# Patient Record
Sex: FEMALE | Race: WHITE | NOT HISPANIC OR LATINO | URBAN - METROPOLITAN AREA
[De-identification: names, ages, dates, MRNs, and addresses within clinical notes are randomized per-mention and may not be internally consistent; named-entity substitution may affect disease eponyms.]

---

## 2018-11-01 ENCOUNTER — OFFICE VISIT (OUTPATIENT)
Dept: ORTHOPEDIC SURGERY | Facility: CLINIC | Age: 68
End: 2018-11-01

## 2018-11-01 VITALS — OXYGEN SATURATION: 96 % | WEIGHT: 194.22 LBS | HEIGHT: 63 IN | HEART RATE: 72 BPM | BODY MASS INDEX: 34.41 KG/M2

## 2018-11-01 DIAGNOSIS — M65.30 TRIGGER FINGER, ACQUIRED: Primary | ICD-10-CM

## 2018-11-01 PROCEDURE — 99204 OFFICE O/P NEW MOD 45 MIN: CPT | Performed by: ORTHOPAEDIC SURGERY

## 2018-11-01 NOTE — PROGRESS NOTES
[facility] [place] [facility]opa[facility] Surgery Clinic Note    Subjective     Pain of the Right Hand (Right hand. Ring finger. No injury but started after yard work in spring. No treatment, ice and heat tried. Getting worse over time. 8/10 pain when extended hand. Can , but can't extend finger after.)      RANCHO Lin is a 68 y.o. female.  Patient is here today for an issue with her right ring digit.  She is right-hand-dominant and this began in the spring of 2018.  No history of any trauma.  The finger is locking.  She has exquisite pain when she goes from the locked position to the extended position.  She has tried anti-inflammatories and activity modification without a lot of improvement.  She is here for further evaluation and treatment.     Past Medical History:   Diagnosis Date   • Arthritis    • Shoulder injury     left   • Tear of meniscus of knee       Past Surgical History:   Procedure Laterality Date   • APPENDECTOMY     • KNEE SURGERY Right    • TONSILLECTOMY        Family History   Problem Relation Age of Onset   • Cancer Other      Social History     Social History   • Marital status:      Spouse name: N/A   • Number of children: N/A   • Years of education: N/A     Occupational History   • Not on file.     Social History Main Topics   • Smoking status: Never Smoker   • Smokeless tobacco: Not on file   • Alcohol use Yes      Comment: socially   • Drug use: No   • Sexual activity: Defer     Other Topics Concern   • Not on file     Social History Narrative   • No narrative on file      No current outpatient prescriptions on file prior to visit.     No current facility-administered medications on file prior to visit.       No Known Allergies     The following portions of the patient's history were reviewed and updated as appropriate: allergies, current medications, past family history, past medical history, past social history, past surgical history and problem list.    Review of Systems  "  Musculoskeletal: Positive for arthralgias and joint swelling.   All other systems reviewed and are negative.       Objective      Physical Exam  Pulse 72   Ht 160 cm (63\")   Wt 88.1 kg (194 lb 3.6 oz)   SpO2 96%   BMI 34.41 kg/m²     Body mass index is 34.41 kg/m².    General  Mental Status - alert  General Appearance - cooperative, well groomed, not in acute distress  Orientation - Oriented X3  Build & Nutrition - well developed and well nourished  Posture - normal posture  Gait - normal gait     Integumentary  Global Assessment  Examination of related systems reveals - no lymphadenopathy  Ears:  No abnormality  Nose:  No mucous drainage  General Characteristics  Overall examination of the patient's skin reveals - no rashes, no evidence of scars, no suspicious lesions and no bruises.  Color - normal coloration of skin.  Vascular: Brisk capillary refill in all extremities    Ortho Exam  Peripheral Vascular   Bilateral Upper Extremity    No cyanotic nail beds    Pink nail beds and rapid capillary refill   Palpation    Radial Pulse - Bilaterally normal    Neurologic   Sensory: Light touch intact- Right hand       Right Upper Extremity    Right wrist extensors: 5/5    Right wrist flexors: 5/5    Right intrinsics: 5/5    Musculoskeletal      Right Elbow    Forearm supination: AROM - 90 degrees    Forearm pronation: AROM - 90 degrees     Inspection and Palpation   Right Wrist      Tenderness - none    Swelling - none    Crepitus - none    Muscle tone - no atrophy        ROJM:      Right Wrist    Flexion: AROM - 90 degrees    Extension: AROM - 90 degrees     Deformities, Malalignments, Discrepancies    None     Functional Testing   Right Wrist    Tinel's Sign-- negative    Phalen's Sign-- negative    Carpal Compression Test-- negative    Finklestein's Test-- negative    Thumb CMC joint--negative       Strength and Tone    Right  strength: good         Hand Exam:  There is reproducible locking with the right ring " digit, palpable nodule, tender to palpation at the A1 pulley, mild Dupuytren's cord noted.    Imaging/Studies  Imaging Results (last 24 hours)     Procedure Component Value Units Date/Time    XR Hand 3+ View Right [[id number]] Resulted:  11/01/18 1042     Updated:  11/01/18 1043    Narrative:       Right Hand X-Ray    Indication: Pain    Views:  AP, Lateral, and Oblique     Comparison: none    Findings:  No fracture  No bony lesion  Normal soft tissues  Normal joint spaces    Impression: No acute bony abnormalities right hand                Assessment:  1. Trigger finger, acquired        Plan:  1. Continue over-the-counter medication as needed for discomfort  2. With a long discussion with the patient today about treatment options and alternatives.  At this point, we have told her that injections can be done but are typically less effective when there is such a profound locking noted.  The risks, benefits, potential hazards, typical recovery and rehabilitation as well as wrist alternatives to right ring digit trigger finger release were discussed with the patient this morning.  She had the opportunity to ask questions and wishes to proceed with scheduling.  We will get this done at the next available date.      Medical Decision Making  Management Options : over-the-counter medicine and major surgery with risk factors  Data/Risk: radiology tests and independent visualization of imaging, lab tests, or EMG/NCV    Vick Bradley MD  11/01/18  6:53 PM

## 2018-11-02 ENCOUNTER — OUTSIDE FACILITY SERVICE (OUTPATIENT)
Dept: ORTHOPEDIC SURGERY | Facility: CLINIC | Age: 68
End: 2018-11-02

## 2018-11-02 PROCEDURE — 26055 INCISE FINGER TENDON SHEATH: CPT | Performed by: ORTHOPAEDIC SURGERY

## 2018-11-20 ENCOUNTER — OFFICE VISIT (OUTPATIENT)
Dept: ORTHOPEDIC SURGERY | Facility: CLINIC | Age: 68
End: 2018-11-20

## 2018-11-20 DIAGNOSIS — M65.30 TRIGGER FINGER, ACQUIRED: ICD-10-CM

## 2018-11-20 DIAGNOSIS — Z98.890 S/P TRIGGER FINGER RELEASE: Primary | ICD-10-CM

## 2018-11-20 PROCEDURE — 99024 POSTOP FOLLOW-UP VISIT: CPT | Performed by: PHYSICIAN ASSISTANT

## 2018-11-20 RX ORDER — HYDROCODONE BITARTRATE AND ACETAMINOPHEN 5; 325 MG/1; MG/1
TABLET ORAL
COMMUNITY
Start: 2018-11-02 | End: 2018-11-20

## 2018-11-20 RX ORDER — BENZONATATE 200 MG/1
CAPSULE ORAL
COMMUNITY
Start: 2018-11-02 | End: 2018-11-20

## 2018-11-20 NOTE — PROGRESS NOTES
[facility] [place] Orthopaedic Surgery Clinic Note    Subjective     CC: Post-op (18 days s/p RIGHT RING DIGIT A1 PULLEY RELEASE - 11/02/18)      RANCHO Lin is a 68 y.o. female.  Patient returns today for initial postop visit status post right ring finger A1 pulley trigger release performed on the above date by Dr. Bradley.  She has no complaints or issues.  States that the trigger and she was noted preoperatively is no longer exist.  She still endorses mild discomfort and pain with a pain scale 2/10.  Severity the pain is dull.  She also noticed a slight flexed position to the finger.       ROS:    Constiutional:Pt denies fever, chills, nausea, or vomiting.  MSK:as above    Objective      Past Medical History  Past Medical History:   Diagnosis Date   • Arthritis    • Shoulder injury     left   • Tear of meniscus of knee          Physical Exam  There were no vitals taken for this visit.    There is no height or weight on file to calculate BMI.    Patient is well nourished and well developed.        Ortho Exam    Right Upper Extremity:        Musculoskeletal     Inspection and Palpation:      Hand/Wrist:      Tenderness - mild directly over surgical site    Swelling - minimal     ROM:  Slightly diminished but within normal limits    Motion: Grossly intact radial, ulnar, median, AIN, PIN.    Sensory: Grossly intact light touch radial, ulnar, median nerve distributions    Vascular: 2+ radial pulse with brisk capillary refill noted and each digit.       Incision:  Sutures are in place with wound healing well.  No redness, drainage, warmth or evidence of infection noted       Imaging/Labs/EMG Reviewed:  Imaging Results (last 24 hours)     ** No results found for the last 24 hours. **      None today.    Assessment:  1. S/P trigger finger release    2. Trigger finger, acquired        Plan:  1. Discussion was had with patient regarding exam, assessment and treatments for status post right ring trigger digit  release.  2. Sutures were removed today.  3. Encourage range of motion.  Reviewed wrist and hand exercises and provided handout demonstrating the exercises.  4. She was provided a splint to wear at night to keep the finger in extension and help stretch out.  5. Recommend over-the-counter pain medication as needed.  6. Follow-up in 3-4 weeks for repeat evaluation, sooner if issues arise.  7. Question and concerns answered.      Medical Decision Making  Management Options : over-the-counter medicine      Lorna Olson PA-C  11/20/18  10:47 AM

## 2018-12-11 ENCOUNTER — OFFICE VISIT (OUTPATIENT)
Dept: ORTHOPEDIC SURGERY | Facility: CLINIC | Age: 68
End: 2018-12-11

## 2018-12-11 DIAGNOSIS — Z98.890 S/P TRIGGER FINGER RELEASE: Primary | ICD-10-CM

## 2018-12-11 DIAGNOSIS — M65.30 TRIGGER FINGER, ACQUIRED: ICD-10-CM

## 2018-12-11 PROCEDURE — 99024 POSTOP FOLLOW-UP VISIT: CPT | Performed by: PHYSICIAN ASSISTANT

## 2018-12-11 NOTE — PROGRESS NOTES
[facility] [place] Orthopa[facility] Surgery Clinic Note    Subjective     Post-op (3 week f/u; 5 weeks status post Right Ring Digit A1 Pulley Release 11/02/18)       RANCHO Lin is a 68 y.o. female.  Patient returns for follow-up evaluation right ring finger A-1 pulley release performed on the above date by Dr. Bradley.  No complaints or issues at this time.  She does note some scar tissue buildup the incision site she is working on scar tissue massage.  She has no pain, no numbness or tingling into the digit.  She's had no further locking or catching since surgery.  No reported fever, chills, night sweats or other constitutional symptoms.         Objective      Physical Exam  There were no vitals taken for this visit.    There is no height or weight on file to calculate BMI.        Ortho Exam  Right Upper Extremity:       Musculoskeletal                 Inspection and Palpation:                  Hand/Wrist:                              Tenderness - none                          Swelling - none                                 ROM:   full wrist, hand and digital range of motion without any restrictions or limitations.  All ranges of motion are pain-free.                          Motion: Grossly intact radial, ulnar, median, AIN, PIN.                          Sensory: Grossly intact light touch radial, ulnar, median nerve distributions                          Vascular: 2+ radial pulse with brisk capillary refill noted and each digit.                                        Incision:   Incision is well-healed without redness, warmth, drainage or evidence of infection.        Assessment:  1. S/P trigger finger release    2. Trigger finger, acquired        Plan:  1. Discussion was had with patient regarding exam, assessment and treatments for status post right ring trigger digit release.  2. Continue with advancing activity as tolerated.  3. Recommend over-the-counter pain medication as needed.  4. Follow-up as  needed.  5. Question and concerns answered.      Lorna Olson PA-C  12/11/18  10:14 AM

## 2021-03-05 ENCOUNTER — IMMUNIZATION (OUTPATIENT)
Dept: VACCINE CLINIC | Facility: HOSPITAL | Age: 71
End: 2021-03-05

## 2021-03-05 PROCEDURE — 91300 HC SARSCOV02 VAC 30MCG/0.3ML IM: CPT | Performed by: INTERNAL MEDICINE

## 2021-03-05 PROCEDURE — 0001A: CPT | Performed by: INTERNAL MEDICINE

## 2021-03-26 ENCOUNTER — IMMUNIZATION (OUTPATIENT)
Dept: VACCINE CLINIC | Facility: HOSPITAL | Age: 71
End: 2021-03-26

## 2021-03-26 PROCEDURE — 0002A: CPT | Performed by: INTERNAL MEDICINE

## 2021-03-26 PROCEDURE — 91300 HC SARSCOV02 VAC 30MCG/0.3ML IM: CPT | Performed by: INTERNAL MEDICINE

## 2021-06-14 ENCOUNTER — OFFICE VISIT (OUTPATIENT)
Dept: CARDIOLOGY | Facility: CLINIC | Age: 71
End: 2021-06-14

## 2021-06-14 VITALS
BODY MASS INDEX: 33.84 KG/M2 | DIASTOLIC BLOOD PRESSURE: 79 MMHG | OXYGEN SATURATION: 98 % | HEIGHT: 63 IN | TEMPERATURE: 97.2 F | HEART RATE: 66 BPM | WEIGHT: 191 LBS | SYSTOLIC BLOOD PRESSURE: 135 MMHG

## 2021-06-14 DIAGNOSIS — R00.2 PALPITATIONS: Primary | ICD-10-CM

## 2021-06-14 PROBLEM — J30.2 SEASONAL ALLERGIES: Status: ACTIVE | Noted: 2021-06-14

## 2021-06-14 PROBLEM — K21.9 GERD (GASTROESOPHAGEAL REFLUX DISEASE): Status: ACTIVE | Noted: 2021-06-14

## 2021-06-14 PROCEDURE — 93000 ELECTROCARDIOGRAM COMPLETE: CPT | Performed by: NURSE PRACTITIONER

## 2021-06-14 PROCEDURE — 99203 OFFICE O/P NEW LOW 30 MIN: CPT | Performed by: NURSE PRACTITIONER

## 2021-06-14 RX ORDER — ASPIRIN 81 MG/1
81 TABLET ORAL DAILY
Qty: 90 TABLET | Refills: 3 | Status: SHIPPED | OUTPATIENT
Start: 2021-06-14 | End: 2021-09-23

## 2021-06-14 NOTE — PROGRESS NOTES
Subjective   Analy Lin is a 71 y.o. female     Chief Complaint   Patient presents with   • Establish Care       HPI    Problem list:    1.  Palpitations  2.  GERD  3.  Seasonal allergies    Patient is a 71-year-old female who presents today to establish care due to palpitations.  Patient says for the last 9 months she has had 3 incidents where she had significant fluttering, racing and skipped beats.  She says it can occur anytime.  She says her blood pressure will go up when it happens.  She says she does not necessarily get short of breath but she knows that she will be breathing faster.  She says sometimes it will make her cough.  Patient brought in examples of blood pressure and heart rate she had an episode on 517 where blood pressure was 118/100 and her heart rate was 147.  She says An is start climbing climbing and then it will come back down.  She says there is no rhyme or reason for when it happens.  She denies any chest pain, pressure, dizziness, presyncope, syncope, orthopnea, PND or edema.  She denies any shortness of breath with activity.    Occupation: Retired nurse  She denies smoking, occasional wine or light beer; no illicit drugs  Rare Zetia soda, 1 cup of coffee per day during summer; 2 to 3 cups of coffee during winter; occasional glass of tea    Mom 92 alive-no health problems  Dad 76 -lymphoma  Sister 71.  Alive-palpitations, questionable A. fib    Current Outpatient Medications on File Prior to Visit   Medication Sig Dispense Refill   • [DISCONTINUED] Fexofenadine HCl (ALLEGRA ALLERGY PO) Take  by mouth.       No current facility-administered medications on file prior to visit.       ALLERGIES    Patient has no known allergies.    Past Medical History:   Diagnosis Date   • Arthritis    • COVID-19 vaccine administered  - 2021    2nd -  2021 - Pfzier    • Shoulder injury     left   • Tear of meniscus of knee        Social History     Socioeconomic History   •  Marital status:      Spouse name: Not on file   • Number of children: Not on file   • Years of education: Not on file   • Highest education level: Not on file   Tobacco Use   • Smoking status: Never Smoker   • Smokeless tobacco: Never Used   Substance and Sexual Activity   • Alcohol use: Yes     Comment: socially   • Drug use: No   • Sexual activity: Defer       Family History   Problem Relation Age of Onset   • Cancer Other    • No Known Problems Mother    • Cancer Father    • Hypertension Father        Review of Systems   Constitutional: Negative for appetite change, chills, diaphoresis, fatigue and fever.   HENT: Negative for congestion, rhinorrhea and sore throat.    Eyes: Positive for visual disturbance (contacts ).   Respiratory: Positive for cough (dry ). Negative for chest tightness, shortness of breath and wheezing.    Cardiovascular: Positive for palpitations (fluttering, races and skips beats; last 9 mos 3rd incident; can occur at anytime, BP will go up; breath faster with it; sometimes makes her cough ). Negative for chest pain and leg swelling.   Gastrointestinal: Negative for abdominal pain, blood in stool, constipation, diarrhea, nausea and vomiting.   Endocrine: Negative for cold intolerance and heat intolerance.   Genitourinary: Negative for difficulty urinating, dysuria, frequency, hematuria and urgency.   Musculoskeletal: Positive for arthralgias (knees , shoulders , right hip ), back pain (lower back ) and neck stiffness (when she sleps wrong ). Negative for joint swelling and neck pain.   Skin: Negative for color change, pallor, rash and wound.   Allergic/Immunologic: Negative for environmental allergies and food allergies.   Neurological: Positive for numbness (right arm from time to time ). Negative for dizziness, weakness, light-headedness and headaches.   Hematological: Does not bruise/bleed easily.   Psychiatric/Behavioral: Negative for sleep disturbance.       Objective   /79  "(BP Location: Right arm)   Pulse 66   Temp 97.2 °F (36.2 °C)   Ht 160 cm (63\")   Wt 86.6 kg (191 lb)   SpO2 98%   BMI 33.83 kg/m²   Vitals:    06/14/21 1022   BP: 135/79   BP Location: Right arm   Pulse: 66   Temp: 97.2 °F (36.2 °C)   SpO2: 98%   Weight: 86.6 kg (191 lb)   Height: 160 cm (63\")      Lab Results (most recent)     None        Physical Exam  Vitals reviewed.   Constitutional:       General: She is awake.      Appearance: Normal appearance. She is well-developed and well-groomed. She is obese.   HENT:      Head: Normocephalic.   Eyes:      General: Lids are normal.   Neck:      Vascular: No carotid bruit, hepatojugular reflux or JVD.   Cardiovascular:      Rate and Rhythm: Normal rate and regular rhythm.      Pulses:           Radial pulses are 2+ on the right side and 2+ on the left side.        Dorsalis pedis pulses are 2+ on the right side and 2+ on the left side.        Posterior tibial pulses are 2+ on the right side and 2+ on the left side.      Heart sounds: Normal heart sounds.   Pulmonary:      Effort: Pulmonary effort is normal.      Breath sounds: Normal breath sounds and air entry.   Abdominal:      General: Bowel sounds are normal.      Palpations: Abdomen is soft.   Musculoskeletal:      Right lower leg: No edema.      Left lower leg: No edema.   Skin:     General: Skin is warm and dry.   Neurological:      Mental Status: She is alert and oriented to person, place, and time.   Psychiatric:         Attention and Perception: Attention and perception normal.         Mood and Affect: Mood and affect normal.         Speech: Speech normal.         Behavior: Behavior normal. Behavior is cooperative.         Thought Content: Thought content normal.         Cognition and Memory: Cognition and memory normal.         Judgment: Judgment normal.         Procedure     ECG 12 Lead    Date/Time: 6/14/2021 11:12 AM  Performed by: Lynette Sabillon APRN  Authorized by: Lynette Sabillon APRN "   Comparison: not compared with previous ECG   Rhythm: sinus rhythm  Rate: normal  BPM: 62  QRS axis: normal  Other findings: low voltage    Clinical impression: non-specific ECG                 Assessment/Plan      Diagnosis Plan   1. Palpitations  aspirin (aspirin) 81 MG EC tablet    Cardiac Event Monitor       Return in about 12 weeks (around 9/6/2021).       Palpitations-patient will start aspirin 81.  She will wear an event monitor for 2 weeks.  She will follow-up in 12 weeks or sooner if any changes or abnormalities with testing.  Patient's blood pressure at home is excellent as low as 97 systolic.    Analy Lindquistnaa  reports that she has never smoked. She has never used smokeless tobacco..Advance Care Planning   ACP discussion was declined by the patient. Patient does not have an advance directive, declines further assistance.  Electronically signed by:

## 2021-06-14 NOTE — PATIENT INSTRUCTIONS
Advance Care Planning and Advance Directives     You make decisions on a daily basis - decisions about where you want to live, your career, your home, your life. Perhaps one of the most important decisions you face is your choice for future medical care. Take time to talk with your family and your healthcare team and start planning today.  Advance Care Planning is a process that can help you:  · Understand possible future healthcare decisions in light of your own experiences  · Reflect on those decision in light of your goals and values  · Discuss your decisions with those closest to you and the healthcare professionals that care for you  · Make a plan by creating a document that reflects your wishes    Surrogate Decision Maker  In the event of a medical emergency, which has left you unable to communicate or to make your own decisions, you would need someone to make decisions for you.  It is important to discuss your preferences for medical treatment with this person while you are in good health.     Qualities of a surrogate decision maker:  • Willing to take on this role and responsibility  • Knows what you want for future medical care  • Willing to follow your wishes even if they don't agree with them  • Able to make difficult medical decisions under stressful circumstances    Advance Directives  These are legal documents you can create that will guide your healthcare team and decision maker(s) when needed. These documents can be stored in the electronic medical record.    · Living Will - a legal document to guide your care if you have a terminal condition or a serious illness and are unable to communicate. States vary by statute in document names/types, but most forms may include one or more of the following:        -  Directions regarding life-prolonging treatments        -  Directions regarding artificially provided nutrition/hydration        -  Choosing a healthcare decision maker        -  Direction  regarding organ/tissue donation    · Durable Power of  for Healthcare - this document names an -in-fact to make medical decisions for you, but it may also allow this person to make personal and financial decisions for you. Please seek the advice of an  if you need this type of document.    **Advance Directives are not required and no one may discriminate against you if you do not sign one.    Medical Orders  Many states allow specific forms/orders signed by your physician to record your wishes for medical treatment in your current state of health. This form, signed in personal communication with your physician, addresses resuscitation and other medical interventions that you may or may not want.      For more information or to schedule a time with a [facility] Advance Care Planning Facilitator contact: [facility] or call [phone] and someone will contact you directly.  Palpitations  Palpitations are feelings that your heartbeat is irregular or is faster than normal. It may feel like your heart is fluttering or skipping a beat. Palpitations are usually not a serious problem. They may be caused by many things, including smoking, caffeine, alcohol, stress, and certain medicines or drugs. Most causes of palpitations are not serious. However, some palpitations can be a sign of a serious problem. You may need further tests to rule out serious medical problems.  Follow these instructions at home:         Pay attention to any changes in your condition. Take these actions to help manage your symptoms:  Eating and drinking  · Avoid foods and drinks that may cause palpitations. These may include:  ? Caffeinated coffee, tea, soft drinks, diet pills, and energy drinks.  ? Chocolate.  ? Alcohol.  Lifestyle  · Take steps to reduce your stress and anxiety. Things that can help you relax include:  ? Yoga.  ? Mind-body activities, such as deep breathing, meditation, or using words and images  to create positive thoughts (guided imagery).  ? Physical activity, such as swimming, jogging, or walking. Tell your health care provider if your palpitations increase with activity. If you have chest pain or shortness of breath with activity, do not continue the activity until you are seen by your health care provider.  ? Biofeedback. This is a method that helps you learn to use your mind to control things in your body, such as your heartbeat.  · Do not use drugs, including cocaine or ecstasy. Do not use marijuana.  · Get plenty of rest and sleep. Keep a regular bed time.  General instructions  · Take over-the-counter and prescription medicines only as told by your health care provider.  · Do not use any products that contain nicotine or tobacco, such as cigarettes and e-cigarettes. If you need help quitting, ask your health care provider.  · Keep all follow-up visits as told by your health care provider. This is important. These may include visits for further testing if palpitations do not go away or get worse.  Contact a health care provider if you:  · Continue to have a fast or irregular heartbeat after 24 hours.  · Notice that your palpitations occur more often.  Get help right away if you:  · Have chest pain or shortness of breath.  · Have a severe headache.  · Feel dizzy or you faint.  Summary  · Palpitations are feelings that your heartbeat is irregular or is faster than normal. It may feel like your heart is fluttering or skipping a beat.  · Palpitations may be caused by many things, including smoking, caffeine, alcohol, stress, certain medicines, and drugs.  · Although most causes of palpitations are not serious, some causes can be a sign of a serious medical problem.  · Get help right away if you faint or have chest pain, shortness of breath, a severe headache, or dizziness.  This information is not intended to replace advice given to you by your health care provider. Make sure you discuss any questions  you have with your health care provider.  Document Revised: 01/30/2019 Document Reviewed: 01/30/2019  Elsevier Patient Education © 2021 [employer]

## 2021-07-08 DIAGNOSIS — R07.89 CHEST PAIN, ATYPICAL: ICD-10-CM

## 2021-07-08 DIAGNOSIS — R00.2 PALPITATIONS: Primary | ICD-10-CM

## 2021-07-08 DIAGNOSIS — I47.29 NSVT (NONSUSTAINED VENTRICULAR TACHYCARDIA) (HCC): ICD-10-CM

## 2021-07-08 RX ORDER — ATENOLOL 25 MG/1
12.5 TABLET ORAL DAILY
Qty: 30 TABLET | Refills: 11 | Status: SHIPPED | OUTPATIENT
Start: 2021-07-08 | End: 2021-09-23 | Stop reason: SDUPTHER

## 2021-07-08 NOTE — TELEPHONE ENCOUNTER
Received End of Monitor Results . Per MARTA Gomez     Pt needs Echo, and stress test  if she can walk on treadmill, pt needs labs ( BMP,Mg,TSH, Free t3 and Free T4,) pt will need to start Atenolol 25 mg ( 1/2 tablet ) daily       Pt agreed to have Echo and stress test , she will have labs drawn the day of test . Meds called into [facility] . PT will have labs drawn here next week .

## 2021-08-16 ENCOUNTER — TELEPHONE (OUTPATIENT)
Dept: CARDIOLOGY | Facility: CLINIC | Age: 71
End: 2021-08-16

## 2021-08-16 ENCOUNTER — LAB (OUTPATIENT)
Dept: LAB | Facility: HOSPITAL | Age: 71
End: 2021-08-16

## 2021-08-16 DIAGNOSIS — R07.89 CHEST PAIN, ATYPICAL: ICD-10-CM

## 2021-08-16 DIAGNOSIS — R00.2 PALPITATIONS: ICD-10-CM

## 2021-08-16 LAB
ANION GAP SERPL CALCULATED.3IONS-SCNC: 9.8 MMOL/L (ref 5–15)
BUN SERPL-MCNC: 15 MG/DL (ref 8–23)
BUN/CREAT SERPL: 19 (ref 7–25)
CALCIUM SPEC-SCNC: 9.6 MG/DL (ref 8.6–10.5)
CHLORIDE SERPL-SCNC: 105 MMOL/L (ref 98–107)
CO2 SERPL-SCNC: 27.2 MMOL/L (ref 22–29)
CREAT SERPL-MCNC: 0.79 MG/DL (ref 0.57–1)
GFR SERPL CREATININE-BSD FRML MDRD: 72 ML/MIN/1.73
GLUCOSE SERPL-MCNC: 96 MG/DL (ref 65–99)
MAGNESIUM SERPL-MCNC: 2.2 MG/DL (ref 1.6–2.4)
POTASSIUM SERPL-SCNC: 4.2 MMOL/L (ref 3.5–5.2)
SODIUM SERPL-SCNC: 142 MMOL/L (ref 136–145)
T4 FREE SERPL-MCNC: 1.32 NG/DL (ref 0.93–1.7)
TSH SERPL DL<=0.05 MIU/L-ACNC: 2.03 UIU/ML (ref 0.27–4.2)

## 2021-08-16 PROCEDURE — 84481 FREE ASSAY (FT-3): CPT

## 2021-08-16 PROCEDURE — 84439 ASSAY OF FREE THYROXINE: CPT

## 2021-08-16 PROCEDURE — 36415 COLL VENOUS BLD VENIPUNCTURE: CPT

## 2021-08-16 PROCEDURE — 83735 ASSAY OF MAGNESIUM: CPT

## 2021-08-16 PROCEDURE — 84443 ASSAY THYROID STIM HORMONE: CPT

## 2021-08-16 PROCEDURE — 80048 BASIC METABOLIC PNL TOTAL CA: CPT

## 2021-08-16 NOTE — TELEPHONE ENCOUNTER
----- Message from MARTA Hall sent at 8/16/2021  3:43 PM EDT -----  Please advise patient labs are good      Left mess for pt regarding lab results :     Magnesium   1.6 - 2.4 mg/dL 2.2     Creatinine   0.57 - 1.00 mg/dL 0.79    Sodium   136 - 145 mmol/L 142    Potassium   3.5 - 5.2 mmol/L 4.2      TSH   0.270 - 4.200 uIU/mL 2.030      Free T4   0.93 - 1.70 ng/dL 1.32      SULLY Shipley

## 2021-08-17 LAB — T3FREE SERPL-MCNC: 2.9 PG/ML (ref 2–4.4)

## 2021-08-24 ENCOUNTER — HOSPITAL ENCOUNTER (OUTPATIENT)
Dept: CARDIOLOGY | Facility: HOSPITAL | Age: 71
Discharge: HOME | End: 2021-08-24

## 2021-08-24 DIAGNOSIS — I47.29 NSVT (NONSUSTAINED VENTRICULAR TACHYCARDIA) (HCC): ICD-10-CM

## 2021-08-24 DIAGNOSIS — R07.89 CHEST PAIN, ATYPICAL: ICD-10-CM

## 2021-08-24 DIAGNOSIS — R00.2 PALPITATIONS: ICD-10-CM

## 2021-08-24 PROCEDURE — 93306 TTE W/DOPPLER COMPLETE: CPT | Performed by: INTERNAL MEDICINE

## 2021-08-24 PROCEDURE — 93017 CV STRESS TEST TRACING ONLY: CPT

## 2021-08-24 PROCEDURE — 93306 TTE W/DOPPLER COMPLETE: CPT

## 2021-08-24 PROCEDURE — 93018 CV STRESS TEST I&R ONLY: CPT | Performed by: INTERNAL MEDICINE

## 2021-08-25 ENCOUNTER — TELEPHONE (OUTPATIENT)
Dept: CARDIOLOGY | Facility: CLINIC | Age: 71
End: 2021-08-25

## 2021-08-25 LAB
BH CV STRESS RECOVERY BP: NORMAL MMHG
BH CV STRESS RECOVERY HR: 72 BPM
MAXIMAL PREDICTED HEART RATE: 151 BPM
PERCENT MAX PREDICTED HR: 86.09 %
STRESS BASELINE BP: 114 MMHG
STRESS BASELINE HR: 54 BPM
STRESS O2 SAT REST: 58 %
STRESS PERCENT HR: 101 %
STRESS POST ESTIMATED WORKLOAD: 7 METS
STRESS POST EXERCISE DUR MIN: 6 MIN
STRESS POST PEAK BP: NORMAL MMHG
STRESS POST PEAK HR: 130 BPM
STRESS TARGET HR: 128 BPM

## 2021-08-25 NOTE — TELEPHONE ENCOUNTER
----- Message from MARTA Hall sent at 8/25/2021  6:09 AM EDT -----  Please advise patient.        Pt was advised of Stress Test results :    No EKG evidence of ischemia    SULLY Shipley

## 2021-08-29 LAB
BH CV ECHO MEAS - ACS: 2 CM
BH CV ECHO MEAS - AO MAX PG: 9.7 MMHG
BH CV ECHO MEAS - AO MEAN PG: 5 MMHG
BH CV ECHO MEAS - AO ROOT AREA (BSA CORRECTED): 1.6
BH CV ECHO MEAS - AO ROOT AREA: 7.1 CM^2
BH CV ECHO MEAS - AO ROOT DIAM: 3 CM
BH CV ECHO MEAS - AO V2 MAX: 156 CM/SEC
BH CV ECHO MEAS - AO V2 MEAN: 104 CM/SEC
BH CV ECHO MEAS - AO V2 VTI: 36.3 CM
BH CV ECHO MEAS - BSA(HAYCOCK): 2 M^2
BH CV ECHO MEAS - BSA: 1.9 M^2
BH CV ECHO MEAS - BZI_BMI: 33.8 KILOGRAMS/M^2
BH CV ECHO MEAS - BZI_METRIC_HEIGHT: 160 CM
BH CV ECHO MEAS - BZI_METRIC_WEIGHT: 86.6 KG
BH CV ECHO MEAS - EDV(CUBED): 71 ML
BH CV ECHO MEAS - EDV(MOD-SP4): 65.9 ML
BH CV ECHO MEAS - EDV(TEICH): 75.9 ML
BH CV ECHO MEAS - EF(CUBED): 79.3 %
BH CV ECHO MEAS - EF(MOD-SP4): 59.9 %
BH CV ECHO MEAS - EF(TEICH): 72.1 %
BH CV ECHO MEAS - EF_3D-VOL: 76 %
BH CV ECHO MEAS - ESV(CUBED): 14.7 ML
BH CV ECHO MEAS - ESV(MOD-SP4): 26.4 ML
BH CV ECHO MEAS - ESV(TEICH): 21.2 ML
BH CV ECHO MEAS - FS: 40.8 %
BH CV ECHO MEAS - IVS/LVPW: 0.78
BH CV ECHO MEAS - IVSD: 0.87 CM
BH CV ECHO MEAS - LA DIMENSION: 3.6 CM
BH CV ECHO MEAS - LA/AO: 1.2
BH CV ECHO MEAS - LV DIASTOLIC VOL/BSA (35-75): 34.8 ML/M^2
BH CV ECHO MEAS - LV IVRT: 0.08 SEC
BH CV ECHO MEAS - LV MASS(C)D: 133.5 GRAMS
BH CV ECHO MEAS - LV MASS(C)DI: 70.4 GRAMS/M^2
BH CV ECHO MEAS - LV SYSTOLIC VOL/BSA (12-30): 13.9 ML/M^2
BH CV ECHO MEAS - LVIDD: 4.1 CM
BH CV ECHO MEAS - LVIDS: 2.5 CM
BH CV ECHO MEAS - LVLD AP4: 7.3 CM
BH CV ECHO MEAS - LVLS AP4: 6.3 CM
BH CV ECHO MEAS - LVOT AREA (M): 3.1 CM^2
BH CV ECHO MEAS - LVOT AREA: 3.1 CM^2
BH CV ECHO MEAS - LVOT DIAM: 2 CM
BH CV ECHO MEAS - LVPWD: 1.1 CM
BH CV ECHO MEAS - MV A MAX VEL: 60 CM/SEC
BH CV ECHO MEAS - MV DEC SLOPE: 353 CM/SEC^2
BH CV ECHO MEAS - MV E MAX VEL: 92.1 CM/SEC
BH CV ECHO MEAS - MV E/A: 1.5
BH CV ECHO MEAS - RAP SYSTOLE: 10 MMHG
BH CV ECHO MEAS - RVDD: 2.7 CM
BH CV ECHO MEAS - RVSP: 28.7 MMHG
BH CV ECHO MEAS - SI(AO): 135.3 ML/M^2
BH CV ECHO MEAS - SI(CUBED): 29.7 ML/M^2
BH CV ECHO MEAS - SI(MOD-SP4): 20.8 ML/M^2
BH CV ECHO MEAS - SI(TEICH): 28.9 ML/M^2
BH CV ECHO MEAS - SV(AO): 256.6 ML
BH CV ECHO MEAS - SV(CUBED): 56.3 ML
BH CV ECHO MEAS - SV(MOD-SP4): 39.5 ML
BH CV ECHO MEAS - SV(TEICH): 54.7 ML
BH CV ECHO MEAS - TR MAX VEL: 216 CM/SEC
MAXIMAL PREDICTED HEART RATE: 151 BPM
STRESS TARGET HR: 128 BPM

## 2021-08-30 ENCOUNTER — TELEPHONE (OUTPATIENT)
Dept: CARDIOLOGY | Facility: CLINIC | Age: 71
End: 2021-08-30

## 2021-08-30 NOTE — TELEPHONE ENCOUNTER
----- Message from MARTA Hall sent at 8/29/2021  3:20 PM EDT -----  Please advise patient         Left Pt a mess regarding her  Echo Complete Results :     Visually estimated ejection fraction is 55 to 60%.  3D ejection fraction of 76%     SULLY Shipley

## 2021-09-23 ENCOUNTER — OFFICE VISIT (OUTPATIENT)
Dept: CARDIOLOGY | Facility: CLINIC | Age: 71
End: 2021-09-23

## 2021-09-23 VITALS
TEMPERATURE: 97.4 F | HEIGHT: 63 IN | HEART RATE: 65 BPM | BODY MASS INDEX: 33.31 KG/M2 | OXYGEN SATURATION: 96 % | DIASTOLIC BLOOD PRESSURE: 57 MMHG | WEIGHT: 188 LBS | SYSTOLIC BLOOD PRESSURE: 114 MMHG

## 2021-09-23 DIAGNOSIS — I48.0 PAROXYSMAL ATRIAL FIBRILLATION (HCC): ICD-10-CM

## 2021-09-23 DIAGNOSIS — I47.29 NSVT (NONSUSTAINED VENTRICULAR TACHYCARDIA) (HCC): ICD-10-CM

## 2021-09-23 DIAGNOSIS — R00.2 PALPITATIONS: Primary | ICD-10-CM

## 2021-09-23 DIAGNOSIS — R60.9 PERIPHERAL EDEMA: ICD-10-CM

## 2021-09-23 DIAGNOSIS — I51.89 GRADE I DIASTOLIC DYSFUNCTION: ICD-10-CM

## 2021-09-23 DIAGNOSIS — I49.3 PVC (PREMATURE VENTRICULAR CONTRACTION): ICD-10-CM

## 2021-09-23 PROBLEM — R60.0 PERIPHERAL EDEMA: Status: ACTIVE | Noted: 2021-09-23

## 2021-09-23 PROCEDURE — 99214 OFFICE O/P EST MOD 30 MIN: CPT | Performed by: NURSE PRACTITIONER

## 2021-09-23 RX ORDER — ATENOLOL 25 MG/1
12.5 TABLET ORAL DAILY
Qty: 90 TABLET | Refills: 3 | Status: SHIPPED | OUTPATIENT
Start: 2021-09-23 | End: 2021-09-28

## 2021-09-23 RX ORDER — FLECAINIDE ACETATE 50 MG/1
50 TABLET ORAL 2 TIMES DAILY
Qty: 180 TABLET | Refills: 3 | Status: SHIPPED | OUTPATIENT
Start: 2021-09-23 | End: 2022-03-29 | Stop reason: SDUPTHER

## 2021-09-23 NOTE — PATIENT INSTRUCTIONS
Advance Care Planning and Advance Directives     You make decisions on a daily basis - decisions about where you want to live, your career, your home, your life. Perhaps one of the most important decisions you face is your choice for future medical care. Take time to talk with your family and your healthcare team and start planning today.  Advance Care Planning is a process that can help you:  · Understand possible future healthcare decisions in light of your own experiences  · Reflect on those decision in light of your goals and values  · Discuss your decisions with those closest to you and the healthcare professionals that care for you  · Make a plan by creating a document that reflects your wishes    Surrogate Decision Maker  In the event of a medical emergency, which has left you unable to communicate or to make your own decisions, you would need someone to make decisions for you.  It is important to discuss your preferences for medical treatment with this person while you are in good health.     Qualities of a surrogate decision maker:  • Willing to take on this role and responsibility  • Knows what you want for future medical care  • Willing to follow your wishes even if they don't agree with them  • Able to make difficult medical decisions under stressful circumstances    Advance Directives  These are legal documents you can create that will guide your healthcare team and decision maker(s) when needed. These documents can be stored in the electronic medical record.    · Living Will - a legal document to guide your care if you have a terminal condition or a serious illness and are unable to communicate. States vary by statute in document names/types, but most forms may include one or more of the following:        -  Directions regarding life-prolonging treatments        -  Directions regarding artificially provided nutrition/hydration        -  Choosing a healthcare decision maker        -  Direction  regarding organ/tissue donation    · Durable Power of  for Healthcare - this document names an -in-fact to make medical decisions for you, but it may also allow this person to make personal and financial decisions for you. Please seek the advice of an  if you need this type of document.    **Advance Directives are not required and no one may discriminate against you if you do not sign one.    Medical Orders  Many states allow specific forms/orders signed by your physician to record your wishes for medical treatment in your current state of health. This form, signed in personal communication with your physician, addresses resuscitation and other medical interventions that you may or may not want.        Premature Ventricular Contraction    A premature ventricular contraction (PVC) is a common kind of irregular heartbeat (arrhythmia). These contractions are extra heartbeats that start in the ventricles of the heart and occur too early in the normal sequence. During the PVC, the heart's normal electrical pathway is not used, so the beat is shorter and less effective. In most cases, these contractions come and go and do not require treatment.  What are the causes?  Common causes of the condition include:  · Smoking.  · Drinking alcohol.  · Certain medicines.  · Some illegal drugs.  · Stress.  · Caffeine.  Certain medical conditions can also cause PVCs:  · Heart failure.  · Heart attack, or coronary artery disease.  · Heart valve problems.  · Changes in minerals in the blood (electrolytes).  · Low blood oxygen levels or high carbon dioxide levels.  In many cases, the cause of this condition is not known.  What are the signs or symptoms?  The main symptom of this condition is fast or skipped heartbeats (palpitations). Other symptoms include:  · Chest pain.  · Shortness of breath.  · Feeling tired.  · Dizziness.  · Difficulty exercising.  In some cases, there are no symptoms.  How is this  diagnosed?  This condition may be diagnosed based on:  · Your medical history.  · A physical exam. During the exam, the health care provider will check for irregular heartbeats.  · Tests, such as:  ? An ECG (electrocardiogram) to monitor the electrical activity of your heart.  ? An ambulatory cardiac monitor. This device records your heartbeats for 24 hours or more.  ? Stress tests to see how exercise affects your heart rhythm and blood supply.  ? An echocardiogram. This test uses sound waves (ultrasound) to produce an image of your heart.  ? An electrophysiology study (EPS). This test checks for electrical problems in your heart.  How is this treated?  Treatment for this condition depends on any underlying conditions, the type of PVCs that you are having, and how much the symptoms are interfering with your daily life.  Possible treatments include:  · Avoiding things that cause premature contractions (triggers). These include caffeine and alcohol.  · Taking medicines if symptoms are severe or if the extra heartbeats are frequent.  · Getting treatment for underlying conditions that cause PVCs.  · Having an implantable cardioverter defibrillator (ICD), if you are at risk for a serious arrhythmia. The ICD is a small device that is inserted into your chest to monitor your heartbeat. When it senses an irregular heartbeat, it sends a shock to bring the heartbeat back to normal.  · Having a procedure to destroy the portion of the heart tissue that sends out abnormal signals (catheter ablation).  In some cases, no treatment is required.  Follow these instructions at home:  Lifestyle  · Do not use any products that contain nicotine or tobacco, such as cigarettes, e-cigarettes, and chewing tobacco. If you need help quitting, ask your health care provider.  · Do not use illegal drugs.  · Exercise regularly. Ask your health care provider what type of exercise is safe for you.  · Try to get at least 7-9 hours of sleep each  night, or as much as recommended by your health care provider.  · Find healthy ways to manage stress. Avoid stressful situations when possible.  Alcohol use  · Do not drink alcohol if:  ? Your health care provider tells you not to drink.  ? You are pregnant, may be pregnant, or are planning to become pregnant.  ? Alcohol triggers your episodes.  · If you drink alcohol:  ? Limit how much you use to:  § 0-1 drink a day for women.  § 0-2 drinks a day for men.  · Be aware of how much alcohol is in your drink. In the [place] one drink equals one 12 oz bottle of beer (355 mL), one 5 oz glass of wine (148 mL), or one 1½ oz glass of hard liquor (44 mL).  General instructions  · Take over-the-counter and prescription medicines only as told by your health care provider.  · If caffeine triggers episodes of PVC, do not eat, drink, or use anything with caffeine in it.  · Keep all follow-up visits as told by your health care provider. This is important.  Contact a health care provider if you:  · Feel palpitations.  Get help right away if you:  · Have chest pain.  · Have shortness of breath.  · Have sweating for no reason.  · Have nausea and vomiting.  · Become light-headed or you faint.  Summary  · A premature ventricular contraction (PVC) is a common kind of irregular heartbeat (arrhythmia).  · In most cases, these contractions come and go and do not require treatment.  · You may need to wear an ambulatory cardiac monitor. This records your heartbeats for 24 hours or more.  · Treatment depends on any underlying conditions, the type of PVCs that you are having, and how much the symptoms are interfering with your daily life.  This information is not intended to replace advice given to you by your health care provider. Make sure you discuss any questions you have with your health care provider.  Document Revised: 09/12/2019 Document Reviewed: 09/12/2019  Elsevier Patient Education © 2021 [employer].  For more information or to  schedule a time with a [facility] Advance Care Planning Facilitator contact: [facility] or call [phone] and someone will contact you directly.  Atrial Fibrillation    Atrial fibrillation is a type of irregular or rapid heartbeat (arrhythmia). In atrial fibrillation, the top part of the heart (atria) beats in an irregular pattern. This makes the heart unable to pump blood normally and effectively.  The goal of treatment is to prevent blood clots from forming, control your heart rate, or restore your heartbeat to a normal rhythm. If this condition is not treated, it can cause serious problems, such as a weakened heart muscle (cardiomyopathy) or a stroke.  What are the causes?  This condition is often caused by medical conditions that damage the heart's electrical system. These include:  · High blood pressure (hypertension). This is the most common cause.  · Certain heart problems or conditions, such as heart failure, coronary artery disease, heart valve problems, or heart surgery.  · Diabetes.  · Overactive thyroid (hyperthyroidism).  · Obesity.  · Chronic kidney disease.  In some cases, the cause of this condition is not known.  What increases the risk?  This condition is more likely to develop in:  · Older people.  · People who smoke.  · Athletes who do endurance exercise.  · People who have a family history of atrial fibrillation.  · Men.  · People who use drugs.  · People who drink a lot of alcohol.  · People who have lung conditions, such as emphysema, pneumonia, or COPD.  · People who have obstructive sleep apnea.  What are the signs or symptoms?  Symptoms of this condition include:  · A feeling that your heart is racing or beating irregularly.  · Discomfort or pain in your chest.  · Shortness of breath.  · Sudden light-headedness or weakness.  · Tiring easily during exercise or activity.  · Fatigue.  · Syncope (fainting).  · Sweating.  In some cases, there are no symptoms.  How is this  diagnosed?  Your health care provider may detect atrial fibrillation when taking your pulse. If detected, this condition may be diagnosed with:  · An electrocardiogram (ECG) to check electrical signals of the heart.  · An ambulatory cardiac monitor to record your heart's activity for a few days.  · A transthoracic echocardiogram (TTE) to create pictures of your heart.  · A transesophageal echocardiogram (TATO) to create even closer pictures of your heart.  · A stress test to check your blood supply while you exercise.  · Imaging tests, such as a CT scan or chest X-ray.  · Blood tests.  How is this treated?  Treatment depends on underlying conditions and how you feel when you experience atrial fibrillation. This condition may be treated with:  · Medicines to prevent blood clots or to treat heart rate or heart rhythm problems.  · Electrical cardioversion to reset the heart's rhythm.  · A pacemaker to correct abnormal heart rhythm.  · Ablation to remove the heart tissue that sends abnormal signals.  · Left atrial appendage closure to seal the area where blood clots can form.  In some cases, underlying conditions will be treated.  Follow these instructions at home:  Medicines  · Take over-the counter and prescription medicines only as told by your health care provider.  · Do not take any new medicines without talking to your health care provider.  · If you are taking blood thinners:  ? Talk with your health care provider before you take any medicines that contain aspirin or NSAIDs, such as ibuprofen. These medicines increase your risk for dangerous bleeding.  ? Take your medicine exactly as told, at the same time every day.  ? Avoid activities that could cause injury or bruising, and follow instructions about how to prevent falls.  ? Wear a medical alert bracelet or carry a card that lists what medicines you take.  Lifestyle         · Do not use any products that contain nicotine or tobacco, such as cigarettes,  "e-cigarettes, and chewing tobacco. If you need help quitting, ask your health care provider.  · Eat heart-healthy foods. Talk with a dietitian to make an eating plan that is right for you.  · Exercise regularly as told by your health care provider.  · Do not drink alcohol.  · Lose weight if you are overweight.  · Do not use drugs, including cannabis.  General instructions  · If you have obstructive sleep apnea, manage your condition as told by your health care provider.  · Do not use diet pills unless your health care provider approves. Diet pills can make heart problems worse.  · Keep all follow-up visits as told by your health care provider. This is important.  Contact a health care provider if you:  · Notice a change in the rate, rhythm, or strength of your heartbeat.  · Are taking a blood thinner and you notice more bruising.  · Tire more easily when you exercise or do heavy work.  · Have a sudden change in weight.  Get help right away if you have:    · Chest pain, abdominal pain, sweating, or weakness.  · Trouble breathing.  · Side effects of blood thinners, such as blood in your vomit, stool, or urine, or bleeding that cannot stop.  · Any symptoms of a stroke. \"BE FAST\" is an easy way to remember the main warning signs of a stroke:  ? B - Balance. Signs are dizziness, sudden trouble walking, or loss of balance.  ? E - Eyes. Signs are trouble seeing or a sudden change in vision.  ? F - Face. Signs are sudden weakness or numbness of the face, or the face or eyelid drooping on one side.  ? A - Arms. Signs are weakness or numbness in an arm. This happens suddenly and usually on one side of the body.  ? S - Speech. Signs are sudden trouble speaking, slurred speech, or trouble understanding what people say.  ? T - Time. Time to call emergency services. Write down what time symptoms started.  · Other signs of a stroke, such as:  ? A sudden, severe headache with no known cause.  ? Nausea or " vomiting.  ? Seizure.  These symptoms may represent a serious problem that is an emergency. Do not wait to see if the symptoms will go away. Get medical help right away. Call your local emergency services (911 in the [place] Do not drive yourself to the hospital.  Summary  · Atrial fibrillation is a type of irregular or rapid heartbeat (arrhythmia).  · Symptoms include a feeling that your heart is beating fast or irregularly.  · You may be given medicines to prevent blood clots or to treat heart rate or heart rhythm problems.  · Get help right away if you have signs or symptoms of a stroke.  · Get help right away if you cannot catch your breath or have chest pain or pressure.  This information is not intended to replace advice given to you by your health care provider. Make sure you discuss any questions you have with your health care provider.  Document Revised: 06/10/2020 Document Reviewed: 06/10/2020  [employer] Patient Education © 2021 [employer] Inc.

## 2021-09-23 NOTE — PROGRESS NOTES
Subjective   Analy Lin is a 71 y.o. female     Chief Complaint   Patient presents with   • Follow-up   • Palpitations       HPI    Problem list:    1.  Event Monitor Palpitations/PVC/NSVT  1.1 Afib on Apple Watch 8/15/2021; 9/7/2021; [id number] 2   2.  GERD  3.  Seasonal allergies  4.  Echo 8/24/2021 - EF 55-60%; DD I; trivial AI, trivial to mild MR and TR   5.  Stress Test 8/24/2021 - no ekg evidence of ischemia; PVCs noted during exercise      Patient is a 71-year-old female who presents today for follow-up on testing.  She denies any chest pain or pressure.  Patient says she has had a couple of episodes where she will feel like her heart is racing, pounding and fluttering.  She says they mostly occur in the evening time when she is trying to rest.  She had 2 significant episodes and when she checked her watch both times states that she was in A. fib.  She did not have any of these episodes when she was wearing her heart monitor.  She does not really notice any other symptoms when it occurs except for she will cough and she says it makes her feel like she is having anxiety.  She denies any dizziness, presyncope, syncope, orthopnea or PND.  She does get swelling usually in the heat if she is on for long period of time but she says it is very rare.  She denies any shortness of breath with activity.    We went over her event, stress and echo.  I reviewed patient's EKG on her watch and it does appear that both episodes are A. fib.  Her sister has A. fib as well therefore we will treat her accordingly.    Current Outpatient Medications on File Prior to Visit   Medication Sig Dispense Refill   • [DISCONTINUED] aspirin (aspirin) 81 MG EC tablet Take 1 tablet by mouth Daily. 90 tablet 3   • [DISCONTINUED] atenolol (TENORMIN) 25 MG tablet Take 0.5 tablets by mouth Daily. 30 tablet 11     No current facility-administered medications on file prior to visit.       ALLERGIES    Patient has no known allergies.    Past  Medical History:   Diagnosis Date   • Arthritis    • COVID-19 vaccine administered 1st - 03/05/2021    2nd -  03/26/2021 - [facility]    • Shoulder injury     left   • Tear of meniscus of knee        Social History     Socioeconomic History   • Marital status:      Spouse name: Not on file   • Number of children: Not on file   • Years of education: Not on file   • Highest education level: Not on file   Tobacco Use   • Smoking status: Never Smoker   • Smokeless tobacco: Never Used   Substance and Sexual Activity   • Alcohol use: Yes     Comment: socially   • Drug use: No   • Sexual activity: Defer       Family History   Problem Relation Age of Onset   • Cancer Other    • No Known Problems Mother    • Cancer Father    • Hypertension Father        Review of Systems   Constitutional: Negative for appetite change, chills, diaphoresis, fatigue and fever.   HENT: Negative for congestion, rhinorrhea and sore throat.    Eyes: Positive for visual disturbance (corrective lens ).   Respiratory: Positive for cough (starts out dry and due to allergies ) and wheezing (once in awhile when she is coughing alot ). Negative for chest tightness and shortness of breath.    Cardiovascular: Positive for palpitations (fluttering, heart races and pounds at times; knows it is happening, usually notices it when she is resting, will have coughing spells; uneasy to go to sleep when it happens ) and leg swelling (once in awhile on a hot summer day or if she sits for long periods of time, swelling goes down by night ( small puffiness); js RARE). Negative for chest pain.   Gastrointestinal: Negative for abdominal pain, blood in stool, constipation, diarrhea, nausea and vomiting.   Endocrine: Negative for cold intolerance and heat intolerance.   Genitourinary: Negative for difficulty urinating, dysuria, frequency, hematuria and urgency.   Musculoskeletal: Positive for arthralgias (knees , left shoulder ). Negative for back pain, joint swelling,  "neck pain and neck stiffness.   Skin: Negative for color change, pallor, rash and wound.   Allergic/Immunologic: Positive for environmental allergies (seasonal ). Negative for food allergies.   Neurological: Negative for dizziness, weakness, light-headedness, numbness and headaches.   Hematological: Does not bruise/bleed easily.   Psychiatric/Behavioral: Negative for sleep disturbance.       Objective   /57 (BP Location: Left arm)   Pulse 65   Temp 97.4 °F (36.3 °C)   Ht 160 cm (63\")   Wt 85.3 kg (188 lb)   SpO2 96%   BMI 33.30 kg/m²   Vitals:    09/23/21 1346   BP: 114/57   BP Location: Left arm   Pulse: 65   Temp: 97.4 °F (36.3 °C)   SpO2: 96%   Weight: 85.3 kg (188 lb)   Height: 160 cm (63\")      Lab Results (most recent)     None        Physical Exam  Vitals reviewed.   Constitutional:       General: She is awake.      Appearance: Normal appearance. She is well-developed and well-groomed. She is obese.   HENT:      Head: Normocephalic.   Eyes:      General: Lids are normal.   Neck:      Vascular: No carotid bruit, hepatojugular reflux or JVD.   Cardiovascular:      Rate and Rhythm: Normal rate and regular rhythm.      Pulses:           Radial pulses are 2+ on the right side and 2+ on the left side.        Dorsalis pedis pulses are 2+ on the right side and 2+ on the left side.        Posterior tibial pulses are 2+ on the right side and 2+ on the left side.      Heart sounds: Normal heart sounds.   Pulmonary:      Effort: Pulmonary effort is normal.      Breath sounds: Normal breath sounds and air entry.   Abdominal:      General: Bowel sounds are normal.      Palpations: Abdomen is soft.   Musculoskeletal:      Right lower leg: No edema.      Left lower leg: No edema.   Skin:     General: Skin is warm and dry.   Neurological:      Mental Status: She is alert and oriented to person, place, and time.   Psychiatric:         Attention and Perception: Attention and perception normal.         Mood and " Affect: Mood and affect normal.         Speech: Speech normal.         Behavior: Behavior normal. Behavior is cooperative.         Thought Content: Thought content normal.         Cognition and Memory: Cognition and memory normal.         Judgment: Judgment normal.         Procedure   Procedures         Assessment/Plan      Diagnosis Plan   1. Palpitations  atenolol (TENORMIN) 25 MG tablet    flecainide (TAMBOCOR) 50 MG tablet   2. NSVT (nonsustained ventricular tachycardia) (CMS/HCC)  flecainide (TAMBOCOR) 50 MG tablet   3. PVC (premature ventricular contraction)  flecainide (TAMBOCOR) 50 MG tablet   4. Grade I diastolic dysfunction     5. Peripheral edema     6. Paroxysmal atrial fibrillation (CMS/HCC)  flecainide (TAMBOCOR) 50 MG tablet    rivaroxaban (Xarelto) 20 MG tablet       Return in about 6 months (around 3/23/2022), or nurse visit after 2 pm Tues for EKG for flecainide start.    Palpitations/NSVT/PVCs/paroxysmal A. fib-patient will start flecainide and Xarelto.  Her flecainide she will start on Sunday and she will come in Tuesday in the afternoon for an EKG.  She will stop atenolol on Tuesday.  She says she does not have any blood pressure issues.  Diastolic dysfunction/peripheral edema-no signs of failure.  Patient will discontinue her aspirin.  She will follow-up for nurse visit next Tuesday otherwise she will follow-up in 6 months or sooner if any changes.       Analy Lindquistnaa  reports that she has never smoked. She has never used smokeless tobacco.. Patient brought in medicine list to appointment, it's been reviewed with patient and med list was updated in the chart. Advance Care Planning   ACP discussion was declined by the patient. Patient does not have an advance directive, declines further assistance.    Electronically signed by:

## 2021-09-28 ENCOUNTER — CLINICAL SUPPORT (OUTPATIENT)
Dept: CARDIOLOGY | Facility: CLINIC | Age: 71
End: 2021-09-28

## 2021-09-28 VITALS
HEART RATE: 70 BPM | WEIGHT: 189 LBS | TEMPERATURE: 97 F | DIASTOLIC BLOOD PRESSURE: 74 MMHG | HEIGHT: 63 IN | BODY MASS INDEX: 33.49 KG/M2 | OXYGEN SATURATION: 97 % | SYSTOLIC BLOOD PRESSURE: 121 MMHG

## 2021-09-28 DIAGNOSIS — I47.29 NSVT (NONSUSTAINED VENTRICULAR TACHYCARDIA) (HCC): ICD-10-CM

## 2021-09-28 DIAGNOSIS — R00.2 PALPITATIONS: ICD-10-CM

## 2021-09-28 DIAGNOSIS — I49.3 PVC (PREMATURE VENTRICULAR CONTRACTION): Primary | ICD-10-CM

## 2021-09-28 PROCEDURE — 93000 ELECTROCARDIOGRAM COMPLETE: CPT | Performed by: NURSE PRACTITIONER

## 2021-09-28 NOTE — PROGRESS NOTES
Analy Lin  1950 9/28/2021   ?   Chief Complaint   Patient presents with   • Nurse Visit      ?   HPI:   ?Flecainide start 09/26/2021   ?   ?     Current Outpatient Medications:   •  atenolol (TENORMIN) 25 MG tablet, Take 0.5 tablets by mouth Daily., Disp: 90 tablet, Rfl: 3  •  flecainide (TAMBOCOR) 50 MG tablet, Take 1 tablet by mouth 2 (Two) Times a Day. Start Sunday 9/26/2021, Disp: 180 tablet, Rfl: 3  •  rivaroxaban (Xarelto) 20 MG tablet, Take 1 tablet by mouth Daily., Disp: 90 tablet, Rfl: 3   ?   ?   Patient has no known allergies.         ECG 12 Lead    Date/Time: 9/28/2021 3:12 PM  Performed by: Lynette Sabillon APRN  Authorized by: Lynette Sabillon APRN   Comparison: compared with previous ECG from 6/14/2021  Similar to previous ECG  Rhythm: sinus rhythm  Rate: normal  BPM: 77  QRS axis: normal  Other findings: low voltage    Clinical impression: non-specific ECG  Comments: QT/QTc 407/439             ?   Assessment/Plan    ?   ?   1. ?AFiB    Pt in office for  EKG since starting  Flecanide 50mg BID  on 09/26/2021 Pt had ( Angel night ) her heart was fluttering and racing for around 3hrs. She denies any chest pain, tightness in the chest , sob , Plap's . She confirms she is taking the medication has prescribed . She has not had any problems since Angel night .         Performed EKG.     Per verbal order from MARTA Gomez   Pt will continue her meds has prescribed she will keep her f/up apt unless she needs to be seen sooner .    Informed pt of the above instructions. They verbalized understanding and are aware of plan.   SULLY Shipley

## 2022-03-29 ENCOUNTER — OFFICE VISIT (OUTPATIENT)
Dept: CARDIOLOGY | Facility: CLINIC | Age: 72
End: 2022-03-29

## 2022-03-29 ENCOUNTER — LAB (OUTPATIENT)
Dept: CARDIOLOGY | Facility: CLINIC | Age: 72
End: 2022-03-29

## 2022-03-29 ENCOUNTER — TELEPHONE (OUTPATIENT)
Dept: CARDIOLOGY | Facility: CLINIC | Age: 72
End: 2022-03-29

## 2022-03-29 VITALS
OXYGEN SATURATION: 94 % | DIASTOLIC BLOOD PRESSURE: 67 MMHG | SYSTOLIC BLOOD PRESSURE: 120 MMHG | BODY MASS INDEX: 33.49 KG/M2 | WEIGHT: 189 LBS | HEIGHT: 63 IN | TEMPERATURE: 97.1 F | HEART RATE: 63 BPM

## 2022-03-29 DIAGNOSIS — R60.9 PERIPHERAL EDEMA: ICD-10-CM

## 2022-03-29 DIAGNOSIS — R00.2 PALPITATIONS: ICD-10-CM

## 2022-03-29 DIAGNOSIS — I48.0 PAROXYSMAL ATRIAL FIBRILLATION: ICD-10-CM

## 2022-03-29 DIAGNOSIS — I49.3 PVC (PREMATURE VENTRICULAR CONTRACTION): ICD-10-CM

## 2022-03-29 DIAGNOSIS — I47.29 NSVT (NONSUSTAINED VENTRICULAR TACHYCARDIA): Primary | ICD-10-CM

## 2022-03-29 DIAGNOSIS — I51.89 GRADE I DIASTOLIC DYSFUNCTION: ICD-10-CM

## 2022-03-29 LAB
ALBUMIN SERPL-MCNC: 4.3 G/DL (ref 3.5–5.2)
ALBUMIN/GLOB SERPL: 1.7 G/DL
ALP SERPL-CCNC: 88 U/L (ref 39–117)
ALT SERPL W P-5'-P-CCNC: 24 U/L (ref 1–33)
ANION GAP SERPL CALCULATED.3IONS-SCNC: 12.5 MMOL/L (ref 5–15)
AST SERPL-CCNC: 21 U/L (ref 1–32)
BASOPHILS # BLD AUTO: 0.04 10*3/MM3 (ref 0–0.2)
BASOPHILS NFR BLD AUTO: 0.9 % (ref 0–1.5)
BILIRUB SERPL-MCNC: 0.4 MG/DL (ref 0–1.2)
BUN SERPL-MCNC: 12 MG/DL (ref 8–23)
BUN/CREAT SERPL: 15 (ref 7–25)
CALCIUM SPEC-SCNC: 9.5 MG/DL (ref 8.6–10.5)
CHLORIDE SERPL-SCNC: 101 MMOL/L (ref 98–107)
CO2 SERPL-SCNC: 26.5 MMOL/L (ref 22–29)
CREAT SERPL-MCNC: 0.8 MG/DL (ref 0.57–1)
DEPRECATED RDW RBC AUTO: 45.2 FL (ref 37–54)
EGFRCR SERPLBLD CKD-EPI 2021: 79.4 ML/MIN/1.73
EOSINOPHIL # BLD AUTO: 0.08 10*3/MM3 (ref 0–0.4)
EOSINOPHIL NFR BLD AUTO: 1.9 % (ref 0.3–6.2)
ERYTHROCYTE [DISTWIDTH] IN BLOOD BY AUTOMATED COUNT: 12.6 % (ref 12.3–15.4)
GLOBULIN UR ELPH-MCNC: 2.5 GM/DL
GLUCOSE SERPL-MCNC: 94 MG/DL (ref 65–99)
HCT VFR BLD AUTO: 41.6 % (ref 34–46.6)
HGB BLD-MCNC: 13.7 G/DL (ref 12–15.9)
IMM GRANULOCYTES # BLD AUTO: 0.01 10*3/MM3 (ref 0–0.05)
IMM GRANULOCYTES NFR BLD AUTO: 0.2 % (ref 0–0.5)
LYMPHOCYTES # BLD AUTO: 1.8 10*3/MM3 (ref 0.7–3.1)
LYMPHOCYTES NFR BLD AUTO: 42.1 % (ref 19.6–45.3)
MAGNESIUM SERPL-MCNC: 2.3 MG/DL (ref 1.6–2.4)
MCH RBC QN AUTO: 31.8 PG (ref 26.6–33)
MCHC RBC AUTO-ENTMCNC: 32.9 G/DL (ref 31.5–35.7)
MCV RBC AUTO: 96.5 FL (ref 79–97)
MONOCYTES # BLD AUTO: 0.32 10*3/MM3 (ref 0.1–0.9)
MONOCYTES NFR BLD AUTO: 7.5 % (ref 5–12)
NEUTROPHILS NFR BLD AUTO: 2.03 10*3/MM3 (ref 1.7–7)
NEUTROPHILS NFR BLD AUTO: 47.4 % (ref 42.7–76)
NRBC BLD AUTO-RTO: 0 /100 WBC (ref 0–0.2)
PLATELET # BLD AUTO: 241 10*3/MM3 (ref 140–450)
PMV BLD AUTO: 10.4 FL (ref 6–12)
POTASSIUM SERPL-SCNC: 3.8 MMOL/L (ref 3.5–5.2)
PROT SERPL-MCNC: 6.8 G/DL (ref 6–8.5)
RBC # BLD AUTO: 4.31 10*6/MM3 (ref 3.77–5.28)
SODIUM SERPL-SCNC: 140 MMOL/L (ref 136–145)
T4 FREE SERPL-MCNC: 1.32 NG/DL (ref 0.93–1.7)
TSH SERPL DL<=0.05 MIU/L-ACNC: 2.71 UIU/ML (ref 0.27–4.2)
WBC NRBC COR # BLD: 4.28 10*3/MM3 (ref 3.4–10.8)

## 2022-03-29 PROCEDURE — 93000 ELECTROCARDIOGRAM COMPLETE: CPT | Performed by: NURSE PRACTITIONER

## 2022-03-29 PROCEDURE — 83735 ASSAY OF MAGNESIUM: CPT | Performed by: NURSE PRACTITIONER

## 2022-03-29 PROCEDURE — 84443 ASSAY THYROID STIM HORMONE: CPT | Performed by: NURSE PRACTITIONER

## 2022-03-29 PROCEDURE — 80053 COMPREHEN METABOLIC PANEL: CPT | Performed by: NURSE PRACTITIONER

## 2022-03-29 PROCEDURE — 84481 FREE ASSAY (FT-3): CPT | Performed by: NURSE PRACTITIONER

## 2022-03-29 PROCEDURE — 85025 COMPLETE CBC W/AUTO DIFF WBC: CPT | Performed by: NURSE PRACTITIONER

## 2022-03-29 PROCEDURE — 36415 COLL VENOUS BLD VENIPUNCTURE: CPT

## 2022-03-29 PROCEDURE — 99214 OFFICE O/P EST MOD 30 MIN: CPT | Performed by: NURSE PRACTITIONER

## 2022-03-29 PROCEDURE — 84439 ASSAY OF FREE THYROXINE: CPT | Performed by: NURSE PRACTITIONER

## 2022-03-29 RX ORDER — FLECAINIDE ACETATE 50 MG/1
75 TABLET ORAL 2 TIMES DAILY
Qty: 270 TABLET | Refills: 3 | Status: SHIPPED | OUTPATIENT
Start: 2022-03-29 | End: 2022-06-09 | Stop reason: SDUPTHER

## 2022-03-29 NOTE — PROGRESS NOTES
Subjective   Analy Lin is a 72 y.o. female     Chief Complaint   Patient presents with   • Follow-up       HPI    Problem list:    1.  Event Monitor Palpitations/PVC/NSVT  1.1 Afib on Apple Watch 8/15/2021; 9/7/2021; [id number] 2 Flecainide and Xarelto  2.  GERD  3.  Seasonal allergies  4.  Echo 8/24/2021 - EF 55-60%; DD I; trivial AI, trivial to mild MR and TR   5.  Stress Test 8/24/2021 - no ekg evidence of ischemia; PVCs noted during exercise      Patient is a 72-year-old female who presents today for a follow-up.  She denies any chest pain or pressure.  She does still have some episodes of palpitations that are not as bad as they were before but they are still persistent on occasion.  She notices them really bad when she is coughing and also in the evening time.  She says they can last up to an hour but before the last 3 to 4 hours.  She denies any dizziness, presyncope, syncope, orthopnea, PND or edema.  She denies any shortness of breath with activity.  She denies any signs of bleeding or cerebral ischemic events.  She has not had any recent blood work.    Current Outpatient Medications on File Prior to Visit   Medication Sig Dispense Refill   • rivaroxaban (Xarelto) 20 MG tablet Take 1 tablet by mouth Daily. 90 tablet 3   • [DISCONTINUED] flecainide (TAMBOCOR) 50 MG tablet Take 1 tablet by mouth 2 (Two) Times a Day. Start Sunday 9/26/2021 180 tablet 3     No current facility-administered medications on file prior to visit.       ALLERGIES    Patient has no known allergies.    Past Medical History:   Diagnosis Date   • Arthritis    • Atrial fibrillation (HCC)    • COVID-19 vaccine administered 1st - 03/05/2021    2nd -  03/26/2021 - [facility]  11/04/2021   • Shoulder injury     left   • Tear of meniscus of knee        Social History     Socioeconomic History   • Marital status:    Tobacco Use   • Smoking status: Never Smoker   • Smokeless tobacco: Never Used   Vaping Use   • Vaping Use: Never used  "  Substance and Sexual Activity   • Alcohol use: Yes     Comment: socially   • Drug use: No   • Sexual activity: Defer       Family History   Problem Relation Age of Onset   • Cancer Other    • No Known Problems Mother    • Cancer Father    • Hypertension Father        Review of Systems   Constitutional: Negative for appetite change, chills, diaphoresis, fatigue and fever.   HENT: Negative for congestion, rhinorrhea and sore throat.    Eyes: Positive for visual disturbance (corrective lens ).   Respiratory: Positive for cough (dry and due to allergies ). Negative for chest tightness, shortness of breath and wheezing.    Cardiovascular: Positive for palpitations (race, fluttering worse when she starts coughing really bad , 11/28/2021 , 02/10/2022 and 03/05/2022 ( she had fluttering and would not stop ) at times they lasted for over 1 hrs vs. 3-4 as before ). Negative for chest pain and leg swelling.   Gastrointestinal: Positive for constipation (due to medication ( hard stools) ). Negative for abdominal pain, blood in stool, diarrhea, nausea and vomiting.   Endocrine: Negative for cold intolerance and heat intolerance.   Genitourinary: Negative for difficulty urinating, dysuria, frequency, hematuria and urgency.   Musculoskeletal: Positive for arthralgias (knees and hips and shoulders ) and back pain (lower at times ). Negative for joint swelling, neck pain and neck stiffness.   Skin: Negative for color change, pallor, rash and wound.   Allergic/Immunologic: Positive for environmental allergies (seasonal ). Negative for food allergies.   Neurological: Negative for dizziness, weakness, light-headedness, numbness and headaches.   Hematological: Does not bruise/bleed easily.   Psychiatric/Behavioral: Negative for sleep disturbance.       Objective   /67 (BP Location: Left arm, Patient Position: Sitting)   Pulse 63   Temp 97.1 °F (36.2 °C)   Ht 160 cm (63\")   Wt 85.7 kg (189 lb)   SpO2 94%   BMI 33.48 kg/m² " "  Vitals:    03/29/22 1056   BP: 120/67   BP Location: Left arm   Patient Position: Sitting   Pulse: 63   Temp: 97.1 °F (36.2 °C)   SpO2: 94%   Weight: 85.7 kg (189 lb)   Height: 160 cm (63\")      Lab Results (most recent)     None        Physical Exam  Vitals reviewed.   Constitutional:       General: She is awake.      Appearance: Normal appearance. She is well-developed and well-groomed. She is obese.   HENT:      Head: Normocephalic.   Eyes:      General: Lids are normal.   Neck:      Vascular: No carotid bruit, hepatojugular reflux or JVD.   Cardiovascular:      Rate and Rhythm: Normal rate and regular rhythm.      Pulses:           Radial pulses are 2+ on the right side and 2+ on the left side.        Dorsalis pedis pulses are 2+ on the right side and 2+ on the left side.        Posterior tibial pulses are 2+ on the right side and 2+ on the left side.      Heart sounds: Normal heart sounds.   Pulmonary:      Effort: Pulmonary effort is normal.      Breath sounds: Normal breath sounds and air entry.   Abdominal:      General: Bowel sounds are normal.      Palpations: Abdomen is soft.   Musculoskeletal:      Right lower leg: No edema.      Left lower leg: No edema.   Skin:     General: Skin is warm and dry.   Neurological:      Mental Status: She is alert and oriented to person, place, and time.   Psychiatric:         Attention and Perception: Attention and perception normal.         Mood and Affect: Mood and affect normal.         Speech: Speech normal.         Behavior: Behavior normal. Behavior is cooperative.         Thought Content: Thought content normal.         Cognition and Memory: Cognition and memory normal.         Judgment: Judgment normal.         Procedure     ECG 12 Lead    Date/Time: 3/29/2022 11:04 AM  Performed by: Lynette Sabillon APRN  Authorized by: Lynette Sabillon APRN   Comparison: compared with previous ECG from 9/28/2021  Similar to previous ECG  Rhythm: sinus bradycardia  Rate: " bradycardic  BPM: 56  QRS axis: right  Other findings: low voltage and T wave abnormality    Clinical impression: non-specific ECG  Comments: QT/QTc 430/421                 Assessment/Plan      Diagnosis Plan   1. NSVT (nonsustained ventricular tachycardia) (HCC)  ECG 12 Lead    flecainide (TAMBOCOR) 50 MG tablet   2. Palpitations  ECG 12 Lead    CBC & Differential    Comprehensive Metabolic Panel    TSH    T3, Free    T4, Free    Magnesium    flecainide (TAMBOCOR) 50 MG tablet   3. PVC (premature ventricular contraction)  ECG 12 Lead    flecainide (TAMBOCOR) 50 MG tablet   4. Grade I diastolic dysfunction     5. Peripheral edema     6. Paroxysmal atrial fibrillation (HCC)  ECG 12 Lead    flecainide (TAMBOCOR) 50 MG tablet       Return in about 6 months (around 9/29/2022), or nurse visit tues for EKG/VS increase in flecainide.    NSVT/palpitations/PVCs/A. fib-patient is on flecainide which we will increase to 75 mg twice a day.  She will start this on Sunday and come back in Tuesday for EKG and vital signs.  Diastolic dysfunction/peripheral edema-no signs of failure.  Patient will get a CMP, CBC, TSH, free T3, free T4 and magnesium.  She will continue her medication regimen with above-noted change.  She will follow-up for nurse visit for EKG and vital signs next Tuesday in 6 months otherwise if any changes.       Analy Lin  reports that she has never smoked. She has never used smokeless tobacco..Advance Care Planning   ACP discussion was declined by the patient. Patient does not have an advance directive, declines further assistance. Patient brought in medicine list to appointment, it's been reviewed with patient and med list was updated in the chart.        Electronically signed by:

## 2022-03-29 NOTE — TELEPHONE ENCOUNTER
----- Message from MARTA Hall sent at 3/29/2022  4:17 PM EDT -----  Please advise patient.  Labs are excellent.  Forwarded to PCP.      Pt was advised of all lab results ( Normal)     TSH   0.270 - 4.200 uIU/mL 2.710      Creatinine   0.57 - 1.00 mg/dL 0.80      Sodium   136 - 145 mmol/L 140      Potassium   3.5 - 5.2 mmol/L 3.8      Magnesium   1.6 - 2.4 mg/dL 2.3        SULLY Shipley

## 2022-03-30 LAB — T3FREE SERPL-MCNC: 2.87 PG/ML (ref 2–4.4)

## 2022-04-05 ENCOUNTER — LAB (OUTPATIENT)
Dept: CARDIOLOGY | Facility: CLINIC | Age: 72
End: 2022-04-05

## 2022-04-05 ENCOUNTER — CLINICAL SUPPORT (OUTPATIENT)
Dept: CARDIOLOGY | Facility: CLINIC | Age: 72
End: 2022-04-05

## 2022-04-05 VITALS
DIASTOLIC BLOOD PRESSURE: 71 MMHG | OXYGEN SATURATION: 97 % | BODY MASS INDEX: 33.66 KG/M2 | WEIGHT: 190 LBS | SYSTOLIC BLOOD PRESSURE: 144 MMHG | HEIGHT: 63 IN | TEMPERATURE: 97.6 F | HEART RATE: 63 BPM

## 2022-04-05 DIAGNOSIS — Z79.899 LONG-TERM USE OF HIGH-RISK MEDICATION: ICD-10-CM

## 2022-04-05 DIAGNOSIS — I48.0 PAROXYSMAL ATRIAL FIBRILLATION: ICD-10-CM

## 2022-04-05 DIAGNOSIS — I48.0 PAROXYSMAL ATRIAL FIBRILLATION: Primary | ICD-10-CM

## 2022-04-05 LAB
CHOLEST SERPL-MCNC: 237 MG/DL (ref 0–200)
HDLC SERPL-MCNC: 76 MG/DL (ref 40–60)
LDLC SERPL CALC-MCNC: 146 MG/DL (ref 0–100)
LDLC/HDLC SERPL: 1.89 {RATIO}
TRIGL SERPL-MCNC: 87 MG/DL (ref 0–150)
VLDLC SERPL-MCNC: 15 MG/DL (ref 5–40)

## 2022-04-05 PROCEDURE — 93000 ELECTROCARDIOGRAM COMPLETE: CPT | Performed by: NURSE PRACTITIONER

## 2022-04-05 PROCEDURE — 80061 LIPID PANEL: CPT | Performed by: NURSE PRACTITIONER

## 2022-04-05 PROCEDURE — 36415 COLL VENOUS BLD VENIPUNCTURE: CPT

## 2022-04-05 NOTE — PROGRESS NOTES
Analy Lindquistnaa  1950 4/5/2022   ?   Chief Complaint   Patient presents with   • Nurse Visit       ?   HPI:   ?   ?   ?     Current Outpatient Medications:   •  flecainide (TAMBOCOR) 50 MG tablet, Take 1.5 tablets by mouth 2 (Two) Times a Day. Start Angel 4/3/2022, Disp: 270 tablet, Rfl: 3  •  rivaroxaban (Xarelto) 20 MG tablet, Take 1 tablet by mouth Daily., Disp: 90 tablet, Rfl: 3   ?   ?   Patient has no known allergies.         ECG 12 Lead    Date/Time: 4/5/2022 5:18 PM  Performed by: Lynette Sabillon APRN  Authorized by: Lynette Sabillon APRN   Comparison: compared with previous ECG from 3/29/2022  Comparison to previous ECG: Previously sinus bradycardia  Rhythm: sinus rhythm  Rate: normal  BPM: 63  QRS axis: normal  Other findings: low voltage    Clinical impression: non-specific ECG  Comments: QT/QTc 441/448             ?   Assessment/Plan    ?   1. ?AFiB    Pt in office for 1/3, 2/3 , 3/3 EKG since starting  Flecanide 75 mg BID starting 04/03/2022 pt states that she had some fluttering on Sat . For about 2hrs this was before she started her increase of medication . Pt confirms that she is taking all medication has prescribed . She denies any problems and  For the past 2 days since starting the meds she can tell a difference she feels has though the medication has helped .      Performed EKG.     Per verbal order from MARTA Gomez   Keep follow-up as scheduled    Informed pt of the above instructions. They verbalized understanding and are aware of plan.   -SULLY Shipley  ?

## 2022-04-06 ENCOUNTER — TELEPHONE (OUTPATIENT)
Dept: CARDIOLOGY | Facility: CLINIC | Age: 72
End: 2022-04-06

## 2022-04-06 NOTE — TELEPHONE ENCOUNTER
----- Message from MARTA Hall sent at 4/6/2022  6:22 AM EDT -----  The only thing abnormal for this patient.  LDL should be less than 100.  Watch red meats and fried foods.  Trig are very good and good cholesterol is very good.  Forwarded to PCP.      Pt was advised of the above mess along with lab results :     LDL Cholesterol    0 - 100 mg/dL 146 High       KARLEE ShipleyA

## 2022-06-09 ENCOUNTER — LAB (OUTPATIENT)
Dept: CARDIOLOGY | Facility: CLINIC | Age: 72
End: 2022-06-09

## 2022-06-09 ENCOUNTER — TELEPHONE (OUTPATIENT)
Dept: CARDIOLOGY | Facility: CLINIC | Age: 72
End: 2022-06-09

## 2022-06-09 ENCOUNTER — CLINICAL SUPPORT (OUTPATIENT)
Dept: CARDIOLOGY | Facility: CLINIC | Age: 72
End: 2022-06-09

## 2022-06-09 VITALS
DIASTOLIC BLOOD PRESSURE: 86 MMHG | HEART RATE: 124 BPM | BODY MASS INDEX: 43.05 KG/M2 | SYSTOLIC BLOOD PRESSURE: 137 MMHG | WEIGHT: 186 LBS | HEIGHT: 55 IN | OXYGEN SATURATION: 96 %

## 2022-06-09 DIAGNOSIS — I47.29 NSVT (NONSUSTAINED VENTRICULAR TACHYCARDIA): ICD-10-CM

## 2022-06-09 DIAGNOSIS — R00.2 PALPITATIONS: ICD-10-CM

## 2022-06-09 DIAGNOSIS — I48.0 PAROXYSMAL ATRIAL FIBRILLATION: ICD-10-CM

## 2022-06-09 DIAGNOSIS — I48.92 ATRIAL FLUTTER WITH RAPID VENTRICULAR RESPONSE: Primary | ICD-10-CM

## 2022-06-09 DIAGNOSIS — I49.3 PVC (PREMATURE VENTRICULAR CONTRACTION): ICD-10-CM

## 2022-06-09 LAB
ANION GAP SERPL CALCULATED.3IONS-SCNC: 13 MMOL/L (ref 5–15)
BUN SERPL-MCNC: 15 MG/DL (ref 8–23)
BUN/CREAT SERPL: 13.8 (ref 7–25)
CALCIUM SPEC-SCNC: 10.1 MG/DL (ref 8.6–10.5)
CHLORIDE SERPL-SCNC: 106 MMOL/L (ref 98–107)
CO2 SERPL-SCNC: 24 MMOL/L (ref 22–29)
CREAT SERPL-MCNC: 1.09 MG/DL (ref 0.57–1)
EGFRCR SERPLBLD CKD-EPI 2021: 54.8 ML/MIN/1.73
GLUCOSE SERPL-MCNC: 94 MG/DL (ref 65–99)
MAGNESIUM SERPL-MCNC: 2.3 MG/DL (ref 1.6–2.4)
POTASSIUM SERPL-SCNC: 4.4 MMOL/L (ref 3.5–5.2)
SODIUM SERPL-SCNC: 143 MMOL/L (ref 136–145)
T4 FREE SERPL-MCNC: 1.32 NG/DL (ref 0.93–1.7)
TSH SERPL DL<=0.05 MIU/L-ACNC: 2.02 UIU/ML (ref 0.27–4.2)

## 2022-06-09 PROCEDURE — 84443 ASSAY THYROID STIM HORMONE: CPT | Performed by: NURSE PRACTITIONER

## 2022-06-09 PROCEDURE — 84439 ASSAY OF FREE THYROXINE: CPT | Performed by: NURSE PRACTITIONER

## 2022-06-09 PROCEDURE — 84481 FREE ASSAY (FT-3): CPT | Performed by: NURSE PRACTITIONER

## 2022-06-09 PROCEDURE — 83735 ASSAY OF MAGNESIUM: CPT | Performed by: NURSE PRACTITIONER

## 2022-06-09 PROCEDURE — 36415 COLL VENOUS BLD VENIPUNCTURE: CPT

## 2022-06-09 PROCEDURE — 80048 BASIC METABOLIC PNL TOTAL CA: CPT | Performed by: NURSE PRACTITIONER

## 2022-06-09 PROCEDURE — 93000 ELECTROCARDIOGRAM COMPLETE: CPT | Performed by: NURSE PRACTITIONER

## 2022-06-09 PROCEDURE — 99211 OFF/OP EST MAY X REQ PHY/QHP: CPT | Performed by: NURSE PRACTITIONER

## 2022-06-09 RX ORDER — FLECAINIDE ACETATE 100 MG/1
TABLET ORAL
Qty: 180 TABLET | Refills: 3 | Status: SHIPPED | OUTPATIENT
Start: 2022-06-09

## 2022-06-09 NOTE — TELEPHONE ENCOUNTER
Nurse visit scheduled today at 3. JANET Iniguez Tonya S, APRN  You; Vee Tubbs 5 minutes ago (1:36 PM)     Can she come in this afternoon for EKG and labs?  If so please have Em put her on thanks          Patient has not had any med changes, missed doses, labs or been sick. She does report feeling jittery.     Lynette Sabillon APRN You 26 minutes ago (12:53 PM)       Has she been on steroids?  Sick?  Any other med changes?  Any recent lab work?  Missed any doses of her medication?           Patient called to report her heart rate has stayed at 120-126 all day. She does not feel she is in afib. She has an occasional flutter but no pain or pressure. She has not been able to check her BP. She takes Flecainide 50 mg, 1.5 tablets BID. Message routed to Lynette Sabillon for review. Christina Phan MA

## 2022-06-09 NOTE — PROGRESS NOTES
Anayl Lin  1950 6/9/2022   ?   Chief Complaint   Patient presents with   • Nurse visit      ?   HPI: Patient presents today due to increased heart rate ranging in the 120s. States she has no chest pain/no sob. Does notice heart is racing more than normal, but no other symptoms at this time.                6/9/22 12:48 PM  Christina Phan MA routed this conversation to Lynette Sabillon APRN       Patient called to report her heart rate has stayed at 120-126 all day. She does not feel she is in afib. She has an occasional flutter but no pain or pressure. She has not been able to check her BP. She takes Flecainide 50 mg, 1.5 tablets BID. Message routed to Lynette Sabillon for review. Christina Phan MA           ?   ?   ?     Current Outpatient Medications:   •  flecainide (TAMBOCOR) 50 MG tablet, Take 1.5 tablets by mouth 2 (Two) Times a Day. Start Angel 4/3/2022, Disp: 270 tablet, Rfl: 3  •  rivaroxaban (Xarelto) 20 MG tablet, Take 1 tablet by mouth Daily., Disp: 90 tablet, Rfl: 3   ?   ?   Patient has no known allergies.         ECG 12 Lead    Date/Time: 6/9/2022 4:04 PM  Performed by: Lynette Sabillon APRN  Authorized by: Lynette Sabillon APRN   Comparison: compared with previous ECG from 4/5/2022  Comparison to previous ECG: A flutter with RVR  Rhythm: atrial flutter  Rate: tachycardic  BPM: 125  QRS axis: normal  Other findings: non-specific ST-T wave changes    Clinical impression: abnormal EKG             ?   Assessment & Plan  1)Afib   Per MARTA Gomez Verbal  Please Increase Flecainide 100 mg bid.  Start Metoprolol tartrate 25- Take 0.5 tablet by mouth bid.  Labs ordered today-BMP/MAG/TSH/TF3/TF4  Patient is to monitor symptoms and call back with any questions or concerns, will come in Monday for NV for symptom check and EKG.     Pt verbally understands.    DION REYES MA      ?   ?   ?

## 2022-06-10 ENCOUNTER — TELEPHONE (OUTPATIENT)
Dept: CARDIOLOGY | Facility: CLINIC | Age: 72
End: 2022-06-10

## 2022-06-10 NOTE — TELEPHONE ENCOUNTER
----- Message from MARTA Hall sent at 6/10/2022  6:38 AM EDT -----  Please advise patient that her magnesium, potassium and thyroid were within normal range however her kidney function is slightly up so please make sure she is staying hydrated.  Forwarded labs to PCP.  She is coming back in next weeks we may have her repeat a BMP at her nurse visit.        Pt was advised of the above mess/labs pt aware that we may repeat BMP on Monday when she returns for Nurse Visit pt is willing to repeat labs and states that she is drinking a lot all other labs ( WNL)         Creatinine   0.57 - 1.00 mg/dL 1.09 High      SULLY Shipley

## 2022-06-11 LAB — T3FREE SERPL-MCNC: 2.6 PG/ML (ref 2–4.4)

## 2022-06-13 ENCOUNTER — CLINICAL SUPPORT (OUTPATIENT)
Dept: CARDIOLOGY | Facility: CLINIC | Age: 72
End: 2022-06-13

## 2022-06-13 VITALS
OXYGEN SATURATION: 95 % | SYSTOLIC BLOOD PRESSURE: 107 MMHG | HEART RATE: 51 BPM | BODY MASS INDEX: 33.66 KG/M2 | HEIGHT: 63 IN | DIASTOLIC BLOOD PRESSURE: 57 MMHG | TEMPERATURE: 96.9 F | WEIGHT: 190 LBS

## 2022-06-13 DIAGNOSIS — I47.29 NSVT (NONSUSTAINED VENTRICULAR TACHYCARDIA): ICD-10-CM

## 2022-06-13 DIAGNOSIS — R00.2 PALPITATIONS: ICD-10-CM

## 2022-06-13 DIAGNOSIS — I48.92 ATRIAL FLUTTER WITH RAPID VENTRICULAR RESPONSE: Primary | ICD-10-CM

## 2022-06-13 PROCEDURE — 93000 ELECTROCARDIOGRAM COMPLETE: CPT | Performed by: NURSE PRACTITIONER

## 2022-06-13 NOTE — PROGRESS NOTES
Analy Lin  1950 6/13/2022   ?   Chief Complaint   Patient presents with   • Nurse Visit       ?   HPI:   ?   ?   ?     Current Outpatient Medications:   •  flecainide (TAMBOCOR) 100 MG tablet, Take 1 tablet by mouth 2 times a day, Disp: 180 tablet, Rfl: 3  •  metoprolol tartrate (LOPRESSOR) 25 MG tablet, Take 0.5 tablets by mouth Daily. For 3 days then D/C., Disp: 180 tablet, Rfl: 3  •  rivaroxaban (Xarelto) 20 MG tablet, Take 1 tablet by mouth Daily., Disp: 90 tablet, Rfl: 3   ?   ?   Patient has no known allergies.         ECG 12 Lead    Date/Time: 6/13/2022 3:56 PM  Performed by: Lynette Sabillon APRN  Authorized by: Lynette Sabillon APRN   Comparison: compared with previous ECG from 6/9/2022  Comparison to previous ECG: Previously a flutter with RVR  Rhythm: sinus bradycardia  Rate: bradycardic  BPM: 51  QRS axis: right  Other findings: low voltage and T wave abnormality    Clinical impression: non-specific ECG  Comments: QT/QTc 462/439             ?   Assessment & Plan    ?   ?   1. ?AFiB    Pt in office for  EKG since starting increase of  Flecanide 100 mg Bid  on 06/09/2022 pt states that she feels really tired she is unsure if its due to the heat or medication . Pt states that her Bp and HR this am at 8 am was 107/60 - 48 , and at 12:15 pm it was 110/79 HR 51. She denies any chest pain , tightness in the chest , palps. Dizziness and no SOB . She states that she is sever fatigued and no energy . She checked her HR on 06/09/2022 after the increase of medication ( Flecainide ) her HR was 125 and hour after she had taken her medication her HR went down to 72. She confirms that she is taking all mediation has prescribed .        Performed EKG.     Per verbal order from MARTA Gomez :  Pt will slowly wean off the Metoprolol to 12.5 mg daily for 3 days then she will stop the medication. She will continue to monitor her HR and Bp she will contact us if she starts having a high HR . Pt will keep her  f/up apt unless she needs to be seen before .    Informed pt of the above instructions. They verbalized understanding and are aware of plan.   SULLY Shipley

## 2022-07-22 ENCOUNTER — TELEPHONE (OUTPATIENT)
Dept: CARDIOLOGY | Facility: CLINIC | Age: 72
End: 2022-07-22

## 2022-07-22 DIAGNOSIS — R00.2 PALPITATIONS: ICD-10-CM

## 2022-07-22 DIAGNOSIS — I48.92 ATRIAL FLUTTER WITH RAPID VENTRICULAR RESPONSE: ICD-10-CM

## 2022-07-22 DIAGNOSIS — I47.29 NSVT (NONSUSTAINED VENTRICULAR TACHYCARDIA): ICD-10-CM

## 2022-07-22 NOTE — TELEPHONE ENCOUNTER
Called pt and advised her of the mess below per MARTA Gomez     Have patient take another 1/2 of beta.  If not resolve she may need to go to ER.  She is on the max dose of flecainide to show any benefit and if we change her it is not going to convert her right away    Pt states that she had already converted , she was advised that we would get her back in 2 weeks she was sent to Front Office ( [facility] ) to schedule her apt . Pt states that her HR was ( HR- 69) and Bp ( 116/76) she was advised to take 1/2 tablet daily of the metoprolol and advised to monitor her HR along with Bp and to let us know if her HR gets to low , any other problems she was advised to go to the closest ER due to it being the weekend . Pt gave verbal understanding of the above mess      SULLY Shipley

## 2022-07-22 NOTE — TELEPHONE ENCOUNTER
Patients BP is x30 mins later 122/92 hr 112. She is currently having racing/fluttering in chest, No chest pain/sob.Watch is still showing AFIB.    Patient just notified me again x45 mins, /76 hr 69 and she is starting to feel less sx.

## 2022-07-22 NOTE — TELEPHONE ENCOUNTER
Pt was transferred to me as she told upstairs she's been in AFiB x2hr per her smart watch.       I asked if she's had any CP, tightness, SoB; she stated just palps and erratic HR. Current , /93 .     Per chart review, pt was seen on 6/13/22 for a nurse visit, Metoprolol was stopped and Flecanide was increased.       Per Lynette: have pt take one 1/2 tab of her 25mg Metoprolol, wait 30 min, check BP and HR, then call us back.     Pt verbalized understanding.

## 2022-07-26 ENCOUNTER — TELEPHONE (OUTPATIENT)
Dept: CARDIOLOGY | Facility: CLINIC | Age: 72
End: 2022-07-26

## 2022-07-26 NOTE — TELEPHONE ENCOUNTER
[facility] in [facility] (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    She is faxing to our facility now.    DION REYES MA

## 2022-07-26 NOTE — TELEPHONE ENCOUNTER
[facility] at [facility] called reporting [facility] prescribed a medication that interacts with Flecainide. [facility] to have the [facility] to change medication. Christina Phan MA

## 2022-08-15 ENCOUNTER — OFFICE VISIT (OUTPATIENT)
Dept: CARDIOLOGY | Facility: CLINIC | Age: 72
End: 2022-08-15

## 2022-08-15 VITALS
SYSTOLIC BLOOD PRESSURE: 117 MMHG | TEMPERATURE: 98.6 F | BODY MASS INDEX: 33.49 KG/M2 | HEIGHT: 63 IN | DIASTOLIC BLOOD PRESSURE: 73 MMHG | WEIGHT: 189 LBS | HEART RATE: 63 BPM | OXYGEN SATURATION: 98 %

## 2022-08-15 DIAGNOSIS — I49.3 PVC (PREMATURE VENTRICULAR CONTRACTION): Primary | ICD-10-CM

## 2022-08-15 DIAGNOSIS — Z86.16 HISTORY OF COVID-19: ICD-10-CM

## 2022-08-15 DIAGNOSIS — I47.29 NSVT (NONSUSTAINED VENTRICULAR TACHYCARDIA): ICD-10-CM

## 2022-08-15 DIAGNOSIS — R00.2 PALPITATIONS: ICD-10-CM

## 2022-08-15 DIAGNOSIS — R06.02 SHORTNESS OF BREATH: ICD-10-CM

## 2022-08-15 DIAGNOSIS — I48.0 PAROXYSMAL ATRIAL FIBRILLATION: ICD-10-CM

## 2022-08-15 DIAGNOSIS — I51.89 GRADE I DIASTOLIC DYSFUNCTION: ICD-10-CM

## 2022-08-15 DIAGNOSIS — R60.9 PERIPHERAL EDEMA: ICD-10-CM

## 2022-08-15 PROCEDURE — 99214 OFFICE O/P EST MOD 30 MIN: CPT | Performed by: NURSE PRACTITIONER

## 2022-08-15 NOTE — PROGRESS NOTES
Subjective   Analy Lin is a 72 y.o. female     Chief Complaint   Patient presents with   • Follow-up       HPI    Problem list:    1.  Event Monitor Palpitations/PVC/NSVT  1.1 Afib on Apple Watch 8/15/2021; 9/7/2021; [id number] 2 Flecainide and Xarelto  2.  GERD  3.  Seasonal allergies  4.  Echo 8/24/2021 - EF 55-60%; DD I; trivial AI, trivial to mild MR and TR   5.  Stress Test 8/24/2021 - no ekg evidence of ischemia; PVCs noted during exercise    6. History of COVID-19 7/26/2022    Patient is a 72-year-old female who presents today for follow-up.  Patient had called back in July due to having some episodes of A. fib.  4 days later she was diagnosed positive with COVID.  She says since then she only had 2 other episodes and they were both while she was still treating for COVID.  Patient says a lot of it happened at night.  She says she would wake up and her heart would be racing and then she would feel her self going to A. fib.  She says however when she called us it was very persistent.  She did take the extra half of the metoprolol and it did resolve.  She is use metoprolol the 2 other times again since she had COVID and had not had to use it since.  She denies any chest pain, pressure, dizziness, presyncope, syncope, orthopnea or PND.  Patient says she only has swelling if she is on her feet for long periods and is humid outside or if she drives in a car for long period of time.  She says it is very rare and it does go down on its own.  She did have some shortness of breath with COVID but this has resolved.  She denies any signs of bleeding or cerebral ischemic events.    Due to her having some breakthrough A. fib she has decided to go ahead and see EP for possible ablation.  Her sister tried to have this in the past however they were unable to irritate the part of her heart causing A. fib so she was not able to be fixed.    Current Outpatient Medications on File Prior to Visit   Medication Sig Dispense  Refill   • flecainide (TAMBOCOR) 100 MG tablet Take 1 tablet by mouth 2 times a day 180 tablet 3   • rivaroxaban (Xarelto) 20 MG tablet Take 1 tablet by mouth Daily. 90 tablet 3   • [DISCONTINUED] metoprolol tartrate (LOPRESSOR) 25 MG tablet Take 0.5 tablets by mouth Daily. 180 tablet 3     No current facility-administered medications on file prior to visit.       ALLERGIES    Patient has no known allergies.    Past Medical History:   Diagnosis Date   • Arthritis    • Atrial fibrillation (HCC)    • COVID-19     07/26/2022   • COVID-19 vaccine administered 1st - 03/05/2021    2nd -  03/26/2021 - [facility]zier  11/04/2021   • Shoulder injury     left   • Tear of meniscus of knee        Social History     Socioeconomic History   • Marital status:    Tobacco Use   • Smoking status: Never Smoker   • Smokeless tobacco: Never Used   Vaping Use   • Vaping Use: Never used   Substance and Sexual Activity   • Alcohol use: Yes     Comment: socially   • Drug use: No   • Sexual activity: Defer       Family History   Problem Relation Age of Onset   • Cancer Other    • No Known Problems Mother    • Cancer Father    • Hypertension Father        Review of Systems   Constitutional: Negative for appetite change, chills, fatigue and fever.   HENT: Negative for congestion, rhinorrhea and sore throat.    Eyes: Positive for visual disturbance (corrective lens ).   Respiratory: Positive for cough (bad cough with the Covid 07/26/2022 she took the anit - viral meds ) and shortness of breath (sob due to Covid 08/06/2022; better ). Negative for chest tightness and wheezing.    Cardiovascular: Positive for palpitations (races , fluttering and pounds at random times; better ) and leg swelling (at times , legs, feet and ankles ( when she is travilng , due to heat or when she has been on her feet for long periods of time swelling will go down at night; humid and outside a lot or sitting a lot, rare ). Negative for chest pain.   Gastrointestinal:  "Negative for abdominal pain, blood in stool, constipation, diarrhea, nausea and vomiting.   Endocrine: Negative for cold intolerance and heat intolerance.   Genitourinary: Negative for difficulty urinating, dysuria, frequency, hematuria and urgency.   Musculoskeletal: Positive for arthralgias (knees ) and back pain (lower back ). Negative for joint swelling, neck pain and neck stiffness.   Skin: Negative for color change, pallor, rash and wound.   Allergic/Immunologic: Positive for environmental allergies (seasonal ).   Neurological: Negative for dizziness, weakness, light-headedness, numbness and headaches.   Hematological: Does not bruise/bleed easily.   Psychiatric/Behavioral: Negative for sleep disturbance.       Objective   /73 (BP Location: Left arm, Patient Position: Sitting)   Pulse 63   Temp 98.6 °F (37 °C)   Ht 160 cm (63\")   Wt 85.7 kg (189 lb)   SpO2 98%   BMI 33.48 kg/m²   Vitals:    08/15/22 0910   BP: 117/73   BP Location: Left arm   Patient Position: Sitting   Pulse: 63   Temp: 98.6 °F (37 °C)   SpO2: 98%   Weight: 85.7 kg (189 lb)   Height: 160 cm (63\")      Lab Results (most recent)     None        Physical Exam  Vitals reviewed.   Constitutional:       General: She is awake.      Appearance: Normal appearance. She is well-developed and well-groomed. She is obese.   HENT:      Head: Normocephalic.   Eyes:      General: Lids are normal.   Neck:      Vascular: No carotid bruit, hepatojugular reflux or JVD.   Cardiovascular:      Rate and Rhythm: Normal rate and regular rhythm.      Pulses:           Radial pulses are 2+ on the right side and 2+ on the left side.        Dorsalis pedis pulses are 2+ on the right side and 2+ on the left side.        Posterior tibial pulses are 2+ on the right side and 2+ on the left side.      Heart sounds: Normal heart sounds.   Pulmonary:      Effort: Pulmonary effort is normal.      Breath sounds: Normal breath sounds and air entry.   Abdominal:      " General: Bowel sounds are normal.      Palpations: Abdomen is soft.   Musculoskeletal:      Right lower leg: No edema.      Left lower leg: No edema.   Skin:     General: Skin is warm and dry.   Neurological:      Mental Status: She is alert and oriented to person, place, and time.   Psychiatric:         Attention and Perception: Attention and perception normal.         Mood and Affect: Mood and affect normal.         Speech: Speech normal.         Behavior: Behavior normal. Behavior is cooperative.         Thought Content: Thought content normal.         Cognition and Memory: Cognition and memory normal.         Judgment: Judgment normal.         Procedure   Procedures         Assessment & Plan      Diagnosis Plan   1. PVC (premature ventricular contraction)  Ambulatory Referral to Cardiac Electrophysiology   2. Paroxysmal atrial fibrillation (HCC)  Ambulatory Referral to Cardiac Electrophysiology   3. Palpitations  Ambulatory Referral to Cardiac Electrophysiology   4. NSVT (nonsustained ventricular tachycardia) (HCC)  Ambulatory Referral to Cardiac Electrophysiology   5. Grade I diastolic dysfunction     6. Peripheral edema     7. Shortness of breath     8. History of COVID-19         Return keep f/u.    PVCs/A. fib/palpitations/NSVT-patient's on flecainide and Xarelto.  She will be referred to EP.  Diastolic dysfunction/peripheral edema-no signs of failure.  Shortness of breath-improved.  History of COVID-19-no signs of failure.  She will continue her medication regimen.  She will follow-up as scheduled or sooner if any changes.       Analy Lin  reports that she has never smoked. She has never used smokeless tobacco..        Advance Care Planning   ACP discussion was declined by the patient. Patient does not have an advance directive, declines further assistance. Patient did not bring med list or medicine bottles to appointment, med list has been reviewed and updated based on patient's knowledge of their  meds.          Electronically signed by:

## 2022-09-15 DIAGNOSIS — I48.0 PAROXYSMAL ATRIAL FIBRILLATION: ICD-10-CM

## 2022-09-15 RX ORDER — RIVAROXABAN 20 MG/1
TABLET, FILM COATED ORAL
Qty: 90 TABLET | Refills: 3 | Status: SHIPPED | OUTPATIENT
Start: 2022-09-15

## 2022-10-04 ENCOUNTER — OFFICE VISIT (OUTPATIENT)
Dept: CARDIOLOGY | Facility: CLINIC | Age: 72
End: 2022-10-04

## 2022-10-04 VITALS
DIASTOLIC BLOOD PRESSURE: 76 MMHG | BODY MASS INDEX: 33.31 KG/M2 | HEART RATE: 61 BPM | OXYGEN SATURATION: 97 % | HEIGHT: 63 IN | TEMPERATURE: 98.2 F | SYSTOLIC BLOOD PRESSURE: 116 MMHG | WEIGHT: 188 LBS

## 2022-10-04 DIAGNOSIS — R06.02 SHORTNESS OF BREATH: ICD-10-CM

## 2022-10-04 DIAGNOSIS — Z86.16 HISTORY OF COVID-19: ICD-10-CM

## 2022-10-04 DIAGNOSIS — I47.29 NSVT (NONSUSTAINED VENTRICULAR TACHYCARDIA): ICD-10-CM

## 2022-10-04 DIAGNOSIS — I51.89 GRADE I DIASTOLIC DYSFUNCTION: ICD-10-CM

## 2022-10-04 DIAGNOSIS — I49.3 PVC (PREMATURE VENTRICULAR CONTRACTION): ICD-10-CM

## 2022-10-04 DIAGNOSIS — R00.2 PALPITATIONS: ICD-10-CM

## 2022-10-04 DIAGNOSIS — R60.9 PERIPHERAL EDEMA: ICD-10-CM

## 2022-10-04 DIAGNOSIS — I48.0 PAROXYSMAL ATRIAL FIBRILLATION: Primary | ICD-10-CM

## 2022-10-04 PROCEDURE — 99214 OFFICE O/P EST MOD 30 MIN: CPT | Performed by: NURSE PRACTITIONER

## 2022-10-04 NOTE — PROGRESS NOTES
Subjective   Analy Lin is a 72 y.o. female     Chief Complaint   Patient presents with   • Follow-up       HPI    Problem list:    1.  Event Monitor Palpitations/PVC/NSVT  1.1 Afib on Apple Watch 8/15/2021; 9/7/2021; [id number] 2 Flecainide and Xarelto  2.  GERD  3.  Seasonal allergies  4.  Echo 8/24/2021 - EF 55-60%; DD I; trivial AI, trivial to mild MR and TR   5.  Stress Test 8/24/2021 - no ekg evidence of ischemia; PVCs noted during exercise    6. History of COVID-19 7/26/2022    Patient is a 72-year-old female who presents today for a follow-up.  She denies any chest pain or pressure.  She says she did have 2 episodes of palpitations.  She says 1 when she was at her union had some alcohol and the other time was when she was coughing.  She says both times she would take a little bit of the metoprolol and it would actually resolve.  She denies any dizziness, presyncope, syncope, orthopnea, PND or edema.  She denies any shortness of breath with activity.  Overall she has been doing well.  She denies any signs of bleeding or cerebral ischemic events.      Current Outpatient Medications on File Prior to Visit   Medication Sig Dispense Refill   • flecainide (TAMBOCOR) 100 MG tablet Take 1 tablet by mouth 2 times a day 180 tablet 3   • metoprolol tartrate (LOPRESSOR) 12.5 MG half tablet Take 25 mg by mouth As Needed.     • Xarelto 20 MG tablet TAKE ONE TABLET BY MOUTH EVERY DAY FOR BLOOD THINNER 90 tablet 3     No current facility-administered medications on file prior to visit.       ALLERGIES    Patient has no known allergies.    Past Medical History:   Diagnosis Date   • Arthritis    • Atrial fibrillation (HCC)    • COVID-19     07/26/2022   • COVID-19 vaccine administered 1st - 03/05/2021    2nd -  03/26/2021 - [facility]zier  11/04/2021   • Shoulder injury     left   • Tear of meniscus of knee        Social History     Socioeconomic History   • Marital status:    Tobacco Use   • Smoking status: Never Smoker  "  • Smokeless tobacco: Never Used   Vaping Use   • Vaping Use: Never used   Substance and Sexual Activity   • Alcohol use: Yes     Comment: socially   • Drug use: No   • Sexual activity: Defer       Family History   Problem Relation Age of Onset   • Cancer Other    • No Known Problems Mother    • Cancer Father    • Hypertension Father        Review of Systems   Constitutional: Negative for appetite change, chills, diaphoresis, fatigue and fever.   HENT: Negative for congestion, rhinorrhea and sore throat.    Eyes: Positive for visual disturbance (corrective lens ).   Respiratory: Positive for cough (due to allergies ). Negative for chest tightness and shortness of breath.    Cardiovascular: Positive for palpitations (fluttering , races and pounds at times she states that she has 2 times that she had ( palpations ) when she takes the metoprolol it will resolve within a hour ,the 2nd time she had a cough and it started; thinks one was due to ETOH). Negative for chest pain and leg swelling.   Gastrointestinal: Negative for abdominal pain, blood in stool, constipation, diarrhea, nausea and vomiting.   Endocrine: Negative for cold intolerance and heat intolerance.   Genitourinary: Negative for difficulty urinating, dysuria, frequency, hematuria and urgency.   Musculoskeletal: Positive for arthralgias (knees , shoulders ) and back pain (lower back pain ). Negative for joint swelling, neck pain and neck stiffness.   Skin: Negative for color change, pallor, rash and wound.   Allergic/Immunologic: Positive for environmental allergies (seasonal allergies ). Negative for food allergies.   Neurological: Negative for dizziness, syncope, weakness, light-headedness, numbness and headaches.   Hematological: Does not bruise/bleed easily.   Psychiatric/Behavioral: Negative for sleep disturbance.       Objective   /76 (BP Location: Left arm, Patient Position: Sitting)   Pulse 61   Temp 98.2 °F (36.8 °C)   Ht 160 cm (63\")   " "Wt 85.3 kg (188 lb)   SpO2 97%   BMI 33.30 kg/m²   Vitals:    10/04/22 1019   BP: 116/76   BP Location: Left arm   Patient Position: Sitting   Pulse: 61   Temp: 98.2 °F (36.8 °C)   SpO2: 97%   Weight: 85.3 kg (188 lb)   Height: 160 cm (63\")      Lab Results (most recent)     None        Physical Exam  Vitals reviewed.   Constitutional:       General: She is awake.      Appearance: Normal appearance. She is well-developed and well-groomed. She is obese.   HENT:      Head: Normocephalic.   Eyes:      General: Lids are normal.   Neck:      Vascular: No carotid bruit, hepatojugular reflux or JVD.   Cardiovascular:      Rate and Rhythm: Normal rate and regular rhythm.      Pulses:           Radial pulses are 2+ on the right side and 2+ on the left side.        Dorsalis pedis pulses are 2+ on the right side and 2+ on the left side.        Posterior tibial pulses are 2+ on the right side and 2+ on the left side.      Heart sounds: Normal heart sounds.   Pulmonary:      Effort: Pulmonary effort is normal.      Breath sounds: Normal breath sounds and air entry.   Abdominal:      General: Bowel sounds are normal.      Palpations: Abdomen is soft.   Musculoskeletal:      Right lower leg: No edema.      Left lower leg: No edema.   Skin:     General: Skin is warm and dry.   Neurological:      Mental Status: She is alert and oriented to person, place, and time.   Psychiatric:         Attention and Perception: Attention and perception normal.         Mood and Affect: Mood and affect normal.         Speech: Speech normal.         Behavior: Behavior normal. Behavior is cooperative.         Thought Content: Thought content normal.         Cognition and Memory: Cognition and memory normal.         Judgment: Judgment normal.         Procedure   Procedures         Assessment & Plan      Diagnosis Plan   1. Paroxysmal atrial fibrillation (HCC)     2. PVC (premature ventricular contraction)     3. Palpitations     4. NSVT (nonsustained " ventricular tachycardia) (HCC)     5. Grade I diastolic dysfunction     6. Shortness of breath     7. Peripheral edema     8. History of COVID-19         Return in about 6 months (around 4/4/2023).    A. fib/PVCs/palpitations/NSVT-patient's on flecainide and Xarelto and using metoprolol as needed.  She is seeing EP on October 28.  Diastolic dysfunction/peripheral edema-no signs of failure.  Shortness of breath-resolved.  History of COVID-19-stable.  Patient will continue her medication regimen.  She will follow-up in 6 months or sooner if any changes.         Analy Lin  reports that she has never smoked. She has never used smokeless tobacco.. Advance Care Planning   ACP discussion was declined by the patient. Patient has an advance directive (not in EMR), copy requested. Patient brought in medicine list to appointment, it's been reviewed with patient and med list was updated in the chart.        Electronically signed by:

## 2022-10-27 PROCEDURE — 93000 ELECTROCARDIOGRAM COMPLETE: CPT | Performed by: INTERNAL MEDICINE

## 2022-10-27 NOTE — PROGRESS NOTES
Cardiac Electrophysiology Outpatient Consult Note            [facility]    Consult Note     Analy Lin  [id number]  10/28/2022  [phone]     Primary Care Physician: Dia Duval MD    Referred By: Lynette Sabillon APRN    Subjective     Chief Complaint:   Diagnoses and all orders for this visit:    1. Atrial flutter, unspecified type (HCC) (Primary)  -     ECG 12 Lead      Chief Complaint   Patient presents with   • Paroxysmal atrial fibrillation (HCC)       History of Present Illness:   Analy Lin is a 72 y.o. female who presents to my electrophysiology clinic for evaluation of arrhythmias.  She originally presented to primary cardiology about a year and a half ago with complaint of palpitations.  She was diagnosed with paroxysmal atrial fibrillation based on rhythms from her apple watch.  She was initially started on low-dose metoprolol which caused significant fatigue.  She was then started on Tambocor 50 mg twice daily with as needed metoprolol.  This plan worked well for a while.  This past summer she began having more symptoms of prolonged heart racing with associated dizziness and fatigue.  Her Tambocor was increased to 100 mg twice daily with improvement in symptoms.  Her last known episode was September 17 and lasted for several hours.  She had an echocardiogram which shows a structurally normal heart.  She has had an exercise stress test which showed no ischemia.  Her blood pressures typically run between 110 to 120 mmHg systolic.  She has no history of prior sleep study, no prior history of apnea and no significant daytime sleepiness.  Her thyroid function is normal, caffeine consumption is minimal.  She has no history of CHF, vascular disease, TIA or CVA and is not diabetic.  She has no current complaint of exertional chest pain or dyspnea, denies orthopnea, PND, claudication, lower extremity edema.  She has no current complaint of  "dizziness and no history of syncope.    Past Medical History:   Patient Active Problem List    Diagnosis Date Noted   • Atrial flutter (HCC) 10/28/2022   • Arthritis    • History of COVID-19 08/15/2022   • NSVT (nonsustained ventricular tachycardia) (HCC) 09/23/2021     Note Last Updated: 10/27/2022     · MCOT 6/22/2021 through 7/5/2021: Sinus rhythm with single 4 beat run VT, asymptomatic  · Echocardiogram, 8/24/2021: Normal LV, EF 55 to 60%.  Normal valvular morphology.  Left atrium 3.6 cm  · GXT stress test, 8/24/2021: Achieved 7 METS, no EKG changes.  PVCs noted during exercise.     • PVC (premature ventricular contraction) 09/23/2021   • Grade I diastolic dysfunction 09/23/2021   • Seasonal allergies 06/14/2021   • GERD (gastroesophageal reflux disease) 06/14/2021       Past Surgical History:   Past Surgical History:   Procedure Laterality Date   • APPENDECTOMY     • KNEE SURGERY Right    • SHOULDER SURGERY Left    • TONSILLECTOMY         Social History:   Social History     Socioeconomic History   • Marital status:    Tobacco Use   • Smoking status: Never   • Smokeless tobacco: Never   Vaping Use   • Vaping Use: Never used   Substance and Sexual Activity   • Alcohol use: Yes     Comment: socially   • Drug use: No   • Sexual activity: Defer       Medications:     Current Outpatient Medications:   •  flecainide (TAMBOCOR) 100 MG tablet, Take 1 tablet by mouth 2 times a day, Disp: 180 tablet, Rfl: 3  •  metoprolol tartrate (LOPRESSOR) 12.5 MG half tablet, Take 25 mg by mouth As Needed., Disp: , Rfl:   •  Xarelto 20 MG tablet, TAKE ONE TABLET BY MOUTH EVERY DAY FOR BLOOD THINNER, Disp: 90 tablet, Rfl: 3    Allergies:   No Known Allergies    Objective   Vital Signs:   Vitals:    10/28/22 0951 10/28/22 0953   BP: 140/82 136/80   BP Location: Right arm Left arm   Patient Position: Sitting Sitting   Pulse: 78    SpO2: 98%    Weight: 86.2 kg (190 lb)    Height: 160 cm (63\")        PHYSICAL EXAM  General " "appearance: Awake, alert, cooperative  Head: Normocephalic, without obvious abnormality, atraumatic  Eyes: Conjunctivae/corneas clear, EOMs intact  Neck: no adenopathy, no carotid bruit, no JVD and thyroid: not enlarged  Lungs: clear to auscultation bilaterally and no rhonchi or crackles\", ' symmetric  Heart: regular rate and rhythm, S1, S2 normal, no murmur, click, rub or gallop  Abdomen: Soft, non-tender, bowel sounds normal,  no organomegaly  Extremities: extremities normal, atraumatic, no cyanosis or edema  Skin: Skin color, turgor normal, no rashes or lesions  Neurologic: Grossly normal     Lab Results   Component Value Date    GLUCOSE 94 06/09/2022    CALCIUM 10.1 06/09/2022     06/09/2022    K 4.4 06/09/2022    CO2 24.0 06/09/2022     06/09/2022    BUN 15 06/09/2022    CREATININE 1.09 (H) 06/09/2022    EGFRIFNONA 72 08/16/2021    BCR 13.8 06/09/2022    ANIONGAP 13.0 06/09/2022     Lab Results   Component Value Date    WBC 4.28 03/29/2022    HGB 13.7 03/29/2022    HCT 41.6 03/29/2022    MCV 96.5 03/29/2022     03/29/2022     No results found for: INR, PROTIME  Lab Results   Component Value Date    TSH 2.020 06/09/2022       Cardiac Testing:      I personally viewed and interpreted the patient's EKG/Telemetry/lab data      ECG 12 Lead    Date/Time: 10/27/2022 1:37 PM  Performed by: Manpreet Montana DO  Authorized by: Manpreet Montana DO   Previous ECG: no previous ECG available  Rhythm: sinus rhythm  Rate: bradycardic  BPM: 57  Conduction: conduction normal  ST Segments: ST segments normal  T Waves: T waves normal  QRS axis: normal  Other: no other findings    Clinical impression: normal ECG            Tobacco Cessation: N/A  Obstructive Sleep Apnea Screening: Completed    Assessment & Plan    Diagnoses and all orders for this visit:    1. Atrial flutter, unspecified type (HCC) (Primary)  -     ECG 12 Lead         Diagnosis Plan   1. Atrial flutter, unspecified type (HCC)   typical right atrial " flutter by EKG.  No documentation of A. fib.    Lengthy conversation with the patient that these are indeed different rhythms.  Given the fact that we have no documentation of atrial fibrillation giving her diagnosis of this makes no sense.  75% of patients who have atrial flutter will not have atrial fibrillation and 25% will.    Her symptoms and heart rate documentation seems to correlate with this being recurrent atrial flutter    She is anticoagulated for the primary prevention of stroke and this is reasonable.    Rhythm control strategy with flecainide has been modestly effective at best.    Discussed with her the option of catheter ablation in detail.  Less than 1% chance of significant procedure complication discussed.  Bleeding at the groin Cardy perforation tamponade stroke microinfarction death and other unforeseen complications are possible but unlikely.  Atrial flutter ablation is often curative about 95% of the time.  This would allow her to safely discontinue anticoagulation as well as flecainide.    Should give this some thought.  She will call us next week with her decision    If she wishes to proceed we will leave her on Xarelto stop the flecainide a couple of days beforehand.  This is an outpatient procedure of course.    If she is not interested in proceeding with ablation I will see her in 6 months time        Body mass index is 33.66 kg/m².    I spent 45 minutes in consultation with this patient which included more than 65% of this time in direct face-to-face counseling, physical examination and discussion of my assessment and findings and shared decision making with the patient.  The remainder of the time not spent face to face was performing one, some or all of the following actions:  preparing to see this patient ( eg. Review of tests),  ordering medications, tests or procedures ), care coordination, discussion of the plan with other healthcare providers, documenting clinical information in  Epic well as independently interpreting results and communicating results to patient, family and or caregiver.  All time noted occurred on the date of service.    Follow Up:       Thank you for allowing me to participate in the care of your patient. Please to not hesitate to contact me with additional questions or concerns.      Manpreet Montana DO, [facility], [facility]  Cardiac Electrophysiologist  [facility]    Scribed for Manpreet Montana DO Electronically signed by EDYTA Barton, 10/28/22, 10:23 AM EDT.

## 2022-10-28 ENCOUNTER — OFFICE VISIT (OUTPATIENT)
Dept: CARDIOLOGY | Facility: CLINIC | Age: 72
End: 2022-10-28

## 2022-10-28 VITALS
BODY MASS INDEX: 33.66 KG/M2 | HEART RATE: 78 BPM | WEIGHT: 190 LBS | HEIGHT: 63 IN | DIASTOLIC BLOOD PRESSURE: 80 MMHG | SYSTOLIC BLOOD PRESSURE: 136 MMHG | OXYGEN SATURATION: 98 %

## 2022-10-28 DIAGNOSIS — I48.92 ATRIAL FLUTTER, UNSPECIFIED TYPE: Primary | ICD-10-CM

## 2022-10-28 PROBLEM — R60.0 PERIPHERAL EDEMA: Status: RESOLVED | Noted: 2021-09-23 | Resolved: 2022-10-28

## 2022-10-28 PROBLEM — R60.9 PERIPHERAL EDEMA: Status: RESOLVED | Noted: 2021-09-23 | Resolved: 2022-10-28

## 2022-10-28 PROBLEM — I48.0 PAROXYSMAL ATRIAL FIBRILLATION (HCC): Status: RESOLVED | Noted: 2022-03-29 | Resolved: 2022-10-28

## 2022-10-28 PROCEDURE — 99204 OFFICE O/P NEW MOD 45 MIN: CPT | Performed by: INTERNAL MEDICINE

## 2023-04-25 ENCOUNTER — OFFICE VISIT (OUTPATIENT)
Dept: CARDIOLOGY | Facility: CLINIC | Age: 73
End: 2023-04-25
Payer: MEDICARE

## 2023-04-25 VITALS
DIASTOLIC BLOOD PRESSURE: 67 MMHG | SYSTOLIC BLOOD PRESSURE: 141 MMHG | WEIGHT: 191.6 LBS | HEART RATE: 61 BPM | HEIGHT: 63 IN | BODY MASS INDEX: 33.95 KG/M2 | OXYGEN SATURATION: 99 %

## 2023-04-25 DIAGNOSIS — I10 PRIMARY HYPERTENSION: ICD-10-CM

## 2023-04-25 DIAGNOSIS — R06.02 SHORTNESS OF BREATH: ICD-10-CM

## 2023-04-25 DIAGNOSIS — I48.92 ATRIAL FLUTTER, UNSPECIFIED TYPE: Primary | ICD-10-CM

## 2023-04-25 PROCEDURE — 99213 OFFICE O/P EST LOW 20 MIN: CPT | Performed by: PHYSICIAN ASSISTANT

## 2023-04-25 PROCEDURE — 3077F SYST BP >= 140 MM HG: CPT | Performed by: PHYSICIAN ASSISTANT

## 2023-04-25 PROCEDURE — 3078F DIAST BP <80 MM HG: CPT | Performed by: PHYSICIAN ASSISTANT

## 2023-04-25 NOTE — PROGRESS NOTES
Problem list     Subjective   Analy Lin is a 73 y.o. female     Chief Complaint   Patient presents with   • Follow-up     6 MTH F/U    Problem list:  1.  Atrial flutter  1.1 diagnosed by EKG with patient currently on flecainide for rhythm control and Xarelto for anticoagulation  1.2 following with EP services considering an ablation  2.  Palpitations  2.1 event monitor in 2021 demonstrating PVCs and nonsustained V. tach (4 beats)  3.  Preserved systolic function echocardiogram in 2021  4.  GERD       HPI    Patient is a 73-year-old female who presents to the office to be evaluated.  Patient historically had been seen through our services.  It seems as if patient was unfortunately diagnosed with atrial fibrillation based off of a smart watch.  However, she was later diagnosed with atrial flutter based off an EKG.  She has been on flecainide for rhythm control and anticoagulation with Xarelto.  She saw EP services last visit and apparently ablation was discussed.  She wanted to consider it.    Today, she describes wanting to consider an ablation.  She apparently is seeing EP services in the next 1 to 2 weeks or so.  She does not describe any chest pain or pressure.  Her dyspnea could be mild but she does not describe any progressive shortness of breath.  No PND orthopnea.    Palpitations do occur on occasion.  She does not describe any dizziness, presyncope or syncope.  She does not describe any strokelike symptoms.  No complaints of bleeding on anticoagulation.  She otherwise is stable.    Current Outpatient Medications on File Prior to Visit   Medication Sig Dispense Refill   • flecainide (TAMBOCOR) 100 MG tablet Take 1 tablet by mouth 2 times a day 180 tablet 3   • metoprolol tartrate (LOPRESSOR) 12.5 MG half tablet Take 2 half tablet by mouth As Needed.     • Xarelto 20 MG tablet TAKE ONE TABLET BY MOUTH EVERY DAY FOR BLOOD THINNER 90 tablet 3     No current facility-administered medications on file prior to  "visit.       Patient has no known allergies.    Past Medical History:   Diagnosis Date   • Arthritis    • COVID-19 vaccine administered 1st - 03/05/2021    2nd -  03/26/2021 - [employer]  11/04/2021   • Shoulder injury     left   • Tear of meniscus of knee        Social History     Socioeconomic History   • Marital status:    Tobacco Use   • Smoking status: Never   • Smokeless tobacco: Never   Vaping Use   • Vaping Use: Never used   Substance and Sexual Activity   • Alcohol use: Yes     Comment: socially   • Drug use: No   • Sexual activity: Defer       Family History   Problem Relation Age of Onset   • Cancer Other    • No Known Problems Mother    • Cancer Father    • Hypertension Father        Review of Systems   Constitutional: Negative for appetite change, chills and fever.   HENT: Negative.  Negative for dental problem, drooling, ear discharge, ear pain, facial swelling, postnasal drip, rhinorrhea, sinus pressure, sinus pain, sneezing, sore throat and tinnitus.    Eyes: Negative for pain, discharge, redness and visual disturbance.   Respiratory: Positive for shortness of breath. Negative for cough, choking, chest tightness and wheezing.    Cardiovascular: Positive for palpitations. Negative for chest pain and leg swelling.   Gastrointestinal: Negative for blood in stool, constipation, diarrhea and nausea.   Genitourinary: Negative.  Negative for difficulty urinating.   Musculoskeletal: Positive for arthralgias and back pain. Negative for neck pain.   Skin: Negative for rash and wound.   Neurological: Negative for dizziness, weakness, light-headedness and numbness.   Psychiatric/Behavioral: Negative for sleep disturbance.       Objective   Vitals:    04/25/23 0946   BP: 141/67   Pulse: 61   SpO2: 99%   Weight: 86.9 kg (191 lb 9.6 oz)   Height: 160 cm (62.99\")      /67   Pulse 61   Ht 160 cm (62.99\")   Wt 86.9 kg (191 lb 9.6 oz)   SpO2 99%   BMI 33.95 kg/m²     Lab Results (most recent)     None    "       Physical Exam  Vitals and nursing note reviewed.   Constitutional:       General: She is not in acute distress.     Appearance: Normal appearance. She is well-developed.   HENT:      Head: Normocephalic and atraumatic.   Eyes:      General: No scleral icterus.        Right eye: No discharge.         Left eye: No discharge.      Conjunctiva/sclera: Conjunctivae normal.   Neck:      Vascular: No carotid bruit.   Cardiovascular:      Rate and Rhythm: Normal rate and regular rhythm.      Heart sounds: Normal heart sounds. No murmur heard.    No friction rub. No gallop.   Pulmonary:      Effort: Pulmonary effort is normal. No respiratory distress.      Breath sounds: Normal breath sounds. No wheezing or rales.   Chest:      Chest wall: No tenderness.   Musculoskeletal:      Right lower leg: No edema.      Left lower leg: No edema.   Skin:     General: Skin is warm and dry.      Coloration: Skin is not pale.      Findings: No erythema or rash.   Neurological:      Mental Status: She is alert and oriented to person, place, and time.      Cranial Nerves: No cranial nerve deficit.   Psychiatric:         Behavior: Behavior normal.         Procedure   Procedures       Assessment & Plan     Problems Addressed this Visit        Cardiac and Vasculature    Atrial flutter - Primary    Primary hypertension       Pulmonary and Pneumonias    Shortness of breath   Diagnoses       Codes Comments    Atrial flutter, unspecified type    -  Primary ICD-10-CM: I48.92  ICD-9-CM: 427.32     Shortness of breath     ICD-10-CM: R06.02  ICD-9-CM: 786.05     Primary hypertension     ICD-10-CM: I10  ICD-9-CM: 401.9         Recommendation  1.  Patient is a 73-year-old female who presents to the office for evaluation with history of atrial flutter on rhythm control as well as anticoagulation.  She is interested in a possible ablation and is to see EP services soon.  Hopefully this can be done and she will have significant improvement.  She seems  interested in wanting to come off medication.    2.  Patient with mild amount of dyspnea but history of preserved systolic function and no evidence of ischemia on previous stress test in 2021.  She has no angina at this point.  For now, her dyspnea may be related to deconditioning.  We can monitor for now.    3.  Slight hypertension noted today but she is doing well.  We will monitor.  She may have a degree of whitecoat hypertension.  I will make no changes at this time    4.  We will see patient back for follow-up in 6 months to year or sooner as symptoms discussed.  Follow-up with primary and EP services as scheduled.             Analy Lin  reports that she has never smoked. She has never used smokeless tobacco..            Advance Care Planning   ACP discussion was declined by the patient. Patient has an advance directive in EMR which is still valid.        Electronically signed by:

## 2023-05-08 ENCOUNTER — OFFICE VISIT (OUTPATIENT)
Dept: CARDIOLOGY | Facility: CLINIC | Age: 73
End: 2023-05-08
Payer: MEDICARE

## 2023-05-08 VITALS
BODY MASS INDEX: 33.84 KG/M2 | HEART RATE: 58 BPM | WEIGHT: 191 LBS | OXYGEN SATURATION: 96 % | SYSTOLIC BLOOD PRESSURE: 116 MMHG | DIASTOLIC BLOOD PRESSURE: 62 MMHG | HEIGHT: 63 IN

## 2023-05-08 DIAGNOSIS — I47.29 NSVT (NONSUSTAINED VENTRICULAR TACHYCARDIA): ICD-10-CM

## 2023-05-08 DIAGNOSIS — I48.92 ATRIAL FLUTTER, UNSPECIFIED TYPE: Primary | ICD-10-CM

## 2023-05-08 DIAGNOSIS — I51.89 GRADE I DIASTOLIC DYSFUNCTION: ICD-10-CM

## 2023-05-08 PROBLEM — R06.02 SHORTNESS OF BREATH: Status: RESOLVED | Noted: 2023-04-25 | Resolved: 2023-05-08

## 2023-05-08 PROCEDURE — 3078F DIAST BP <80 MM HG: CPT | Performed by: PHYSICIAN ASSISTANT

## 2023-05-08 PROCEDURE — 1159F MED LIST DOCD IN RCRD: CPT | Performed by: PHYSICIAN ASSISTANT

## 2023-05-08 PROCEDURE — 93000 ELECTROCARDIOGRAM COMPLETE: CPT | Performed by: PHYSICIAN ASSISTANT

## 2023-05-08 PROCEDURE — 1160F RVW MEDS BY RX/DR IN RCRD: CPT | Performed by: PHYSICIAN ASSISTANT

## 2023-05-08 PROCEDURE — 3074F SYST BP LT 130 MM HG: CPT | Performed by: PHYSICIAN ASSISTANT

## 2023-05-08 PROCEDURE — 99213 OFFICE O/P EST LOW 20 MIN: CPT | Performed by: PHYSICIAN ASSISTANT

## 2023-05-08 NOTE — LETTER
May 8, 2023       No Recipients    Patient: Analy Lin   YOB: 1950   Date of Visit: 5/8/2023       Dear Dr. Lara Recipients:    Thank you for referring Analy Lin to me for evaluation. Below are the relevant portions of my assessment and plan of care.    If you have questions, please do not hesitate to call me. I look forward to following Analy along with you.         Sincerely,        Manpreet Montana, DO        CC:   No Recipients    Willy Singh PA  05/08/23 1335  Sign when Signing Visit          Encounter Date:05/08/2023     Patient ID: Analy Lin is a 73 y.o. female.    Dia Duval MD    Chief Complaint: Atrial flutter, unspecified type      PROBLEM LIST:  Patient Active Problem List    Diagnosis Date Noted   • Atrial flutter 10/28/2022     Priority: High   • NSVT (nonsustained ventricular tachycardia) (HCC) 09/23/2021     Priority: High     Note Last Updated: 10/27/2022     · MCOT 6/22/2021 through 7/5/2021: Sinus rhythm with single 4 beat run VT, asymptomatic  · Echocardiogram, 8/24/2021: Normal LV, EF 55 to 60%.  Normal valvular morphology.  Left atrium 3.6 cm  · GXT stress test, 8/24/2021: Achieved 7 METS, no EKG changes.  PVCs noted during exercise.     • PVC (premature ventricular contraction) 09/23/2021     Priority: Medium   • Grade I diastolic dysfunction 09/23/2021     Priority: Low   • Primary hypertension 04/25/2023   • Arthritis    • History of COVID-19 08/15/2022   • Seasonal allergies 06/14/2021   • GERD (gastroesophageal reflux disease) 06/14/2021               History of Present Illness  Patient presents today for follow-up with a history of paroxysmal atrial flutter, NSVT, grade 1 diastolic dysfunction.  She was previously seen by Dr. Montana and found to be a good candidate for flutter ablation.  She wanted to think about it.  She returns today ready to proceed.  States she continues to have symptomatic flutter about once per month although in March she  "had 3 or 4 episodes.  Her blood pressure at home typically runs between 100 to 110 mmHg systolic.  She is compliant with current medical regimen reports no significant adverse side effects.  She denies shortness of breath PND or lower extremity edema.    No Known Allergies    Current Outpatient Medications   Medication Instructions   • flecainide (TAMBOCOR) 100 MG tablet Take 1 tablet by mouth 2 times a day   • metoprolol tartrate (LOPRESSOR) 25 mg, Oral, As Needed   • Xarelto 20 MG tablet TAKE ONE TABLET BY MOUTH EVERY DAY FOR BLOOD THINNER       .    Objective:     /62 (BP Location: Right arm, Patient Position: Sitting, Cuff Size: Adult)   Pulse 58   Ht 160 cm (63\")   Wt 86.6 kg (191 lb)   SpO2 96%   BMI 33.83 kg/m²   Body mass index is 33.83 kg/m².     Vitals reviewed.   Constitutional:       Appearance: Well-developed.   Pulmonary:      Effort: Pulmonary effort is normal. No respiratory distress.      Breath sounds: Normal breath sounds. No wheezing. No rales.      Comments: Bases clear  Chest:      Chest wall: Not tender to palpatation.   Cardiovascular:      Normal rate. Regular rhythm.      Murmurs: There is no murmur.      No gallop. No click. No rub.   Pulses:     Intact distal pulses.   Musculoskeletal: Normal range of motion.       Lab Review:                 TSH        6/9/2022    15:43   TSH   TSH 2.020                 ECG 12 Lead    Date/Time: 5/8/2023 1:33 PM  Performed by: Willy Singh PA  Authorized by: Willy Singh PA   Rhythm: sinus rhythm  Rate: normal  BPM: 58  Conduction: conduction normal  Other findings: non-specific ST-T wave changes    Clinical impression: abnormal EKG                      Assessment:      Diagnosis Plan   1. Atrial flutter, unspecified type   radiofrequency ablation of atrial flutter.  Discontinue Tambocor 3 days prior, continue Xarelto      2. NSVT (nonsustained ventricular tachycardia) ([facility])   EP study at the time of flutter ablation      3. " Grade I diastolic dysfunction   euvolemic, no evidence of CHF symptoms        Plan:     Stable cardiac status.  Continue current medications.   Thank you for allowing us to participate in the care of your patient.       Electronically signed by EDYTA Michael, 05/08/23, 1:35 PM EDT.

## 2023-05-08 NOTE — PROGRESS NOTES
Encounter Date:05/08/2023      Patient ID: Analy Lin is a 73 y.o. female.    Dia Duval MD    Chief Complaint: Atrial flutter, unspecified type      PROBLEM LIST:  Patient Active Problem List    Diagnosis Date Noted   • Atrial flutter 10/28/2022     Priority: High   • NSVT (nonsustained ventricular tachycardia) ([facility]) 09/23/2021     Priority: High     Note Last Updated: 10/27/2022     · MCOT 6/22/2021 through 7/5/2021: Sinus rhythm with single 4 beat run VT, asymptomatic  · Echocardiogram, 8/24/2021: Normal LV, EF 55 to 60%.  Normal valvular morphology.  Left atrium 3.6 cm  · GXT stress test, 8/24/2021: Achieved 7 METS, no EKG changes.  PVCs noted during exercise.     • PVC (premature ventricular contraction) 09/23/2021     Priority: Medium   • Grade I diastolic dysfunction 09/23/2021     Priority: Low   • Primary hypertension 04/25/2023   • Arthritis    • History of COVID-19 08/15/2022   • Seasonal allergies 06/14/2021   • GERD (gastroesophageal reflux disease) 06/14/2021               History of Present Illness  Patient presents today for follow-up with a history of paroxysmal atrial flutter, NSVT, grade 1 diastolic dysfunction.  She was previously seen by Dr. Montana and found to be a good candidate for flutter ablation.  She wanted to think about it.  She returns today ready to proceed.  States she continues to have symptomatic flutter about once per month although in March she had 3 or 4 episodes.  Her blood pressure at home typically runs between 100 to 110 mmHg systolic.  She is compliant with current medical regimen reports no significant adverse side effects.  She denies shortness of breath PND or lower extremity edema.    No Known Allergies    Current Outpatient Medications   Medication Instructions   • flecainide (TAMBOCOR) 100 MG tablet Take 1 tablet by mouth 2 times a day   • metoprolol tartrate (LOPRESSOR) 25 mg, Oral, As Needed   • Xarelto 20 MG tablet TAKE ONE TABLET BY MOUTH  "EVERY DAY FOR BLOOD THINNER       .    Objective:     /62 (BP Location: Right arm, Patient Position: Sitting, Cuff Size: Adult)   Pulse 58   Ht 160 cm (63\")   Wt 86.6 kg (191 lb)   SpO2 96%   BMI 33.83 kg/m²    Body mass index is 33.83 kg/m².     Vitals reviewed.   Constitutional:       Appearance: Well-developed.   Pulmonary:      Effort: Pulmonary effort is normal. No respiratory distress.      Breath sounds: Normal breath sounds. No wheezing. No rales.      Comments: Bases clear  Chest:      Chest wall: Not tender to palpatation.   Cardiovascular:      Normal rate. Regular rhythm.      Murmurs: There is no murmur.      No gallop. No click. No rub.   Pulses:     Intact distal pulses.   Musculoskeletal: Normal range of motion.       Lab Review:                 TSH        6/9/2022    15:43   TSH   TSH 2.020                 ECG 12 Lead    Date/Time: 5/8/2023 1:33 PM  Performed by: Willy Singh PA  Authorized by: Willy Singh PA   Rhythm: sinus rhythm  Rate: normal  BPM: 58  Conduction: conduction normal  Other findings: non-specific ST-T wave changes    Clinical impression: abnormal EKG                       Assessment:      Diagnosis Plan   1. Atrial flutter, unspecified type   radiofrequency ablation of atrial flutter.  Discontinue Tambocor 3 days prior, continue Xarelto      2. NSVT (nonsustained ventricular tachycardia) (HCC)   EP study at the time of flutter ablation      3. Grade I diastolic dysfunction   euvolemic, no evidence of CHF symptoms        Plan:     Stable cardiac status.  Continue current medications.   Thank you for allowing us to participate in the care of your patient.       Electronically signed by EDYTA Michael, 05/08/23, 1:35 PM EDT.    "

## 2023-05-08 NOTE — H&P (VIEW-ONLY)
Encounter Date:05/08/2023      Patient ID: Analy Lin is a 73 y.o. female.    Dia Duval MD    Chief Complaint: Atrial flutter, unspecified type      PROBLEM LIST:  Patient Active Problem List    Diagnosis Date Noted   • Atrial flutter 10/28/2022     Priority: High   • NSVT (nonsustained ventricular tachycardia) ([facility]) 09/23/2021     Priority: High     Note Last Updated: 10/27/2022     · MCOT 6/22/2021 through 7/5/2021: Sinus rhythm with single 4 beat run VT, asymptomatic  · Echocardiogram, 8/24/2021: Normal LV, EF 55 to 60%.  Normal valvular morphology.  Left atrium 3.6 cm  · GXT stress test, 8/24/2021: Achieved 7 METS, no EKG changes.  PVCs noted during exercise.     • PVC (premature ventricular contraction) 09/23/2021     Priority: Medium   • Grade I diastolic dysfunction 09/23/2021     Priority: Low   • Primary hypertension 04/25/2023   • Arthritis    • History of COVID-19 08/15/2022   • Seasonal allergies 06/14/2021   • GERD (gastroesophageal reflux disease) 06/14/2021               History of Present Illness  Patient presents today for follow-up with a history of paroxysmal atrial flutter, NSVT, grade 1 diastolic dysfunction.  She was previously seen by Dr. Montana and found to be a good candidate for flutter ablation.  She wanted to think about it.  She returns today ready to proceed.  States she continues to have symptomatic flutter about once per month although in March she had 3 or 4 episodes.  Her blood pressure at home typically runs between 100 to 110 mmHg systolic.  She is compliant with current medical regimen reports no significant adverse side effects.  She denies shortness of breath PND or lower extremity edema.    No Known Allergies    Current Outpatient Medications   Medication Instructions   • flecainide (TAMBOCOR) 100 MG tablet Take 1 tablet by mouth 2 times a day   • metoprolol tartrate (LOPRESSOR) 25 mg, Oral, As Needed   • Xarelto 20 MG tablet TAKE ONE TABLET BY MOUTH  "EVERY DAY FOR BLOOD THINNER       .    Objective:     /62 (BP Location: Right arm, Patient Position: Sitting, Cuff Size: Adult)   Pulse 58   Ht 160 cm (63\")   Wt 86.6 kg (191 lb)   SpO2 96%   BMI 33.83 kg/m²    Body mass index is 33.83 kg/m².     Vitals reviewed.   Constitutional:       Appearance: Well-developed.   Pulmonary:      Effort: Pulmonary effort is normal. No respiratory distress.      Breath sounds: Normal breath sounds. No wheezing. No rales.      Comments: Bases clear  Chest:      Chest wall: Not tender to palpatation.   Cardiovascular:      Normal rate. Regular rhythm.      Murmurs: There is no murmur.      No gallop. No click. No rub.   Pulses:     Intact distal pulses.   Musculoskeletal: Normal range of motion.       Lab Review:                 TSH        6/9/2022    15:43   TSH   TSH 2.020                 ECG 12 Lead    Date/Time: 5/8/2023 1:33 PM  Performed by: Willy Singh PA  Authorized by: Willy Singh PA   Rhythm: sinus rhythm  Rate: normal  BPM: 58  Conduction: conduction normal  Other findings: non-specific ST-T wave changes    Clinical impression: abnormal EKG                       Assessment:      Diagnosis Plan   1. Atrial flutter, unspecified type   radiofrequency ablation of atrial flutter.  Discontinue Tambocor 3 days prior, continue Xarelto      2. NSVT (nonsustained ventricular tachycardia) (HCC)   EP study at the time of flutter ablation      3. Grade I diastolic dysfunction   euvolemic, no evidence of CHF symptoms        Plan:     Stable cardiac status.  Continue current medications.   Thank you for allowing us to participate in the care of your patient.       Electronically signed by EDYTA Michael, 05/08/23, 1:35 PM EDT.    "

## 2023-05-09 ENCOUNTER — PREP FOR SURGERY (OUTPATIENT)
Dept: OTHER | Facility: HOSPITAL | Age: 73
End: 2023-05-09
Payer: MEDICARE

## 2023-05-09 DIAGNOSIS — I47.29 NSVT (NONSUSTAINED VENTRICULAR TACHYCARDIA): Primary | ICD-10-CM

## 2023-05-09 RX ORDER — SODIUM CHLORIDE 9 MG/ML
40 INJECTION, SOLUTION INTRAVENOUS AS NEEDED
OUTPATIENT
Start: 2023-05-09

## 2023-05-09 RX ORDER — ACETAMINOPHEN 325 MG/1
650 TABLET ORAL EVERY 4 HOURS PRN
OUTPATIENT
Start: 2023-05-09

## 2023-05-09 RX ORDER — ONDANSETRON 2 MG/ML
4 INJECTION INTRAMUSCULAR; INTRAVENOUS EVERY 6 HOURS PRN
OUTPATIENT
Start: 2023-05-09

## 2023-05-09 RX ORDER — NITROGLYCERIN 0.4 MG/1
0.4 TABLET SUBLINGUAL
OUTPATIENT
Start: 2023-05-09

## 2023-05-30 ENCOUNTER — HOSPITAL ENCOUNTER (OUTPATIENT)
Facility: HOSPITAL | Age: 73
Discharge: HOME | End: 2023-05-30
Attending: INTERNAL MEDICINE | Admitting: INTERNAL MEDICINE
Payer: MEDICARE

## 2023-05-30 VITALS
BODY MASS INDEX: 33.7 KG/M2 | RESPIRATION RATE: 12 BRPM | HEART RATE: 79 BPM | OXYGEN SATURATION: 91 % | DIASTOLIC BLOOD PRESSURE: 56 MMHG | HEIGHT: 63 IN | WEIGHT: 190.2 LBS | TEMPERATURE: 97 F | SYSTOLIC BLOOD PRESSURE: 95 MMHG

## 2023-05-30 DIAGNOSIS — I48.92 ATRIAL FLUTTER, UNSPECIFIED TYPE: ICD-10-CM

## 2023-05-30 DIAGNOSIS — I47.29 NSVT (NONSUSTAINED VENTRICULAR TACHYCARDIA): ICD-10-CM

## 2023-05-30 LAB
ANION GAP SERPL CALCULATED.3IONS-SCNC: 9 MMOL/L (ref 5–15)
BUN SERPL-MCNC: 13 MG/DL (ref 8–23)
BUN/CREAT SERPL: 16.9 (ref 7–25)
CALCIUM SPEC-SCNC: 9.8 MG/DL (ref 8.6–10.5)
CHLORIDE SERPL-SCNC: 108 MMOL/L (ref 98–107)
CO2 SERPL-SCNC: 28 MMOL/L (ref 22–29)
CREAT SERPL-MCNC: 0.77 MG/DL (ref 0.57–1)
DEPRECATED RDW RBC AUTO: 42.9 FL (ref 37–54)
EGFRCR SERPLBLD CKD-EPI 2021: 82.6 ML/MIN/1.73
ERYTHROCYTE [DISTWIDTH] IN BLOOD BY AUTOMATED COUNT: 12.3 % (ref 12.3–15.4)
GLUCOSE SERPL-MCNC: 101 MG/DL (ref 65–99)
HCT VFR BLD AUTO: 38 % (ref 34–46.6)
HGB BLD-MCNC: 12.7 G/DL (ref 12–15.9)
MCH RBC QN AUTO: 31.8 PG (ref 26.6–33)
MCHC RBC AUTO-ENTMCNC: 33.4 G/DL (ref 31.5–35.7)
MCV RBC AUTO: 95.2 FL (ref 79–97)
PLATELET # BLD AUTO: 205 10*3/MM3 (ref 140–450)
PMV BLD AUTO: 9.8 FL (ref 6–12)
POTASSIUM SERPL-SCNC: 3.9 MMOL/L (ref 3.5–5.2)
QT INTERVAL: 348 MS
QTC INTERVAL: 522 MS
RBC # BLD AUTO: 3.99 10*6/MM3 (ref 3.77–5.28)
SODIUM SERPL-SCNC: 145 MMOL/L (ref 136–145)
WBC NRBC COR # BLD: 3.4 10*3/MM3 (ref 3.4–10.8)

## 2023-05-30 PROCEDURE — 99153 MOD SED SAME PHYS/QHP EA: CPT | Performed by: INTERNAL MEDICINE

## 2023-05-30 PROCEDURE — 85027 COMPLETE CBC AUTOMATED: CPT | Performed by: PHYSICIAN ASSISTANT

## 2023-05-30 PROCEDURE — 93623 PRGRMD STIMJ&PACG IV RX NFS: CPT | Performed by: INTERNAL MEDICINE

## 2023-05-30 PROCEDURE — C1730 CATH, EP, 19 OR FEW ELECT: HCPCS | Performed by: INTERNAL MEDICINE

## 2023-05-30 PROCEDURE — 99152 MOD SED SAME PHYS/QHP 5/>YRS: CPT | Performed by: INTERNAL MEDICINE

## 2023-05-30 PROCEDURE — C1760 CLOSURE DEV, VASC: HCPCS | Performed by: INTERNAL MEDICINE

## 2023-05-30 PROCEDURE — 93662 INTRACARDIAC ECG (ICE): CPT | Performed by: INTERNAL MEDICINE

## 2023-05-30 PROCEDURE — C1894 INTRO/SHEATH, NON-LASER: HCPCS | Performed by: INTERNAL MEDICINE

## 2023-05-30 PROCEDURE — 0 LIDOCAINE 1 % SOLUTION: Performed by: INTERNAL MEDICINE

## 2023-05-30 PROCEDURE — 25010000002 MIDAZOLAM PER 1 MG: Performed by: INTERNAL MEDICINE

## 2023-05-30 PROCEDURE — 93653 COMPRE EP EVAL TX SVT: CPT | Performed by: INTERNAL MEDICINE

## 2023-05-30 PROCEDURE — 80048 BASIC METABOLIC PNL TOTAL CA: CPT | Performed by: PHYSICIAN ASSISTANT

## 2023-05-30 PROCEDURE — 93005 ELECTROCARDIOGRAM TRACING: CPT | Performed by: INTERNAL MEDICINE

## 2023-05-30 PROCEDURE — C1766 INTRO/SHEATH,STRBLE,NON-PEEL: HCPCS | Performed by: INTERNAL MEDICINE

## 2023-05-30 PROCEDURE — C1893 INTRO/SHEATH, FIXED,NON-PEEL: HCPCS | Performed by: INTERNAL MEDICINE

## 2023-05-30 PROCEDURE — C1759 CATH, INTRA ECHOCARDIOGRAPHY: HCPCS | Performed by: INTERNAL MEDICINE

## 2023-05-30 PROCEDURE — 25010000002 FENTANYL CITRATE (PF) 50 MCG/ML SOLUTION: Performed by: INTERNAL MEDICINE

## 2023-05-30 PROCEDURE — 25010000002 ADENOSINE PER 6 MG: Performed by: INTERNAL MEDICINE

## 2023-05-30 PROCEDURE — C2630 CATH, EP, COOL-TIP: HCPCS | Performed by: INTERNAL MEDICINE

## 2023-05-30 PROCEDURE — 25010000002 ONDANSETRON PER 1 MG: Performed by: INTERNAL MEDICINE

## 2023-05-30 RX ORDER — ONDANSETRON 2 MG/ML
INJECTION INTRAMUSCULAR; INTRAVENOUS
Status: DISCONTINUED | OUTPATIENT
Start: 2023-05-30 | End: 2023-05-30 | Stop reason: HOSPADM

## 2023-05-30 RX ORDER — FLECAINIDE ACETATE 150 MG/1
300 TABLET ORAL ONCE
Status: COMPLETED | OUTPATIENT
Start: 2023-05-30 | End: 2023-05-30

## 2023-05-30 RX ORDER — MIDAZOLAM HYDROCHLORIDE 1 MG/ML
INJECTION INTRAMUSCULAR; INTRAVENOUS
Status: DISCONTINUED | OUTPATIENT
Start: 2023-05-30 | End: 2023-05-30 | Stop reason: HOSPADM

## 2023-05-30 RX ORDER — NITROGLYCERIN 0.4 MG/1
0.4 TABLET SUBLINGUAL
Status: DISCONTINUED | OUTPATIENT
Start: 2023-05-30 | End: 2023-05-30 | Stop reason: HOSPADM

## 2023-05-30 RX ORDER — FLECAINIDE ACETATE 100 MG/1
100 TABLET ORAL 2 TIMES DAILY
Qty: 180 TABLET | Refills: 3 | Status: SHIPPED | OUTPATIENT
Start: 2023-05-30

## 2023-05-30 RX ORDER — SODIUM CHLORIDE 9 MG/ML
40 INJECTION, SOLUTION INTRAVENOUS AS NEEDED
Status: DISCONTINUED | OUTPATIENT
Start: 2023-05-30 | End: 2023-05-30 | Stop reason: HOSPADM

## 2023-05-30 RX ORDER — ACETAMINOPHEN 325 MG/1
650 TABLET ORAL EVERY 4 HOURS PRN
Status: DISCONTINUED | OUTPATIENT
Start: 2023-05-30 | End: 2023-05-30 | Stop reason: HOSPADM

## 2023-05-30 RX ORDER — ADENOSINE 3 MG/ML
INJECTION, SOLUTION INTRAVENOUS
Status: DISCONTINUED | OUTPATIENT
Start: 2023-05-30 | End: 2023-05-30 | Stop reason: HOSPADM

## 2023-05-30 RX ORDER — ONDANSETRON 2 MG/ML
4 INJECTION INTRAMUSCULAR; INTRAVENOUS EVERY 6 HOURS PRN
Status: DISCONTINUED | OUTPATIENT
Start: 2023-05-30 | End: 2023-05-30 | Stop reason: HOSPADM

## 2023-05-30 RX ORDER — SODIUM CHLORIDE 9 MG/ML
INJECTION, SOLUTION INTRAVENOUS
Status: COMPLETED | OUTPATIENT
Start: 2023-05-30 | End: 2023-05-30

## 2023-05-30 RX ORDER — FENTANYL CITRATE 50 UG/ML
INJECTION, SOLUTION INTRAMUSCULAR; INTRAVENOUS
Status: DISCONTINUED | OUTPATIENT
Start: 2023-05-30 | End: 2023-05-30 | Stop reason: HOSPADM

## 2023-05-30 RX ORDER — LIDOCAINE HYDROCHLORIDE 10 MG/ML
INJECTION, SOLUTION INFILTRATION; PERINEURAL
Status: DISCONTINUED | OUTPATIENT
Start: 2023-05-30 | End: 2023-05-30 | Stop reason: HOSPADM

## 2023-05-30 RX ORDER — BUPIVACAINE HYDROCHLORIDE 5 MG/ML
INJECTION, SOLUTION PERINEURAL
Status: DISCONTINUED | OUTPATIENT
Start: 2023-05-30 | End: 2023-05-30 | Stop reason: HOSPADM

## 2023-05-30 RX ADMIN — RIVAROXABAN 20 MG: 20 TABLET, FILM COATED ORAL at 14:43

## 2023-05-30 RX ADMIN — FLECAINIDE ACETATE 300 MG: 150 TABLET ORAL at 14:43

## 2023-05-30 NOTE — INTERVAL H&P NOTE
H&P reviewed. The patient was examined and there are no changes to the H&P.       EP Pre-Procedure Report  Cardiovascular Laboratory  [facility]    Patient:  Analy Lin  :  1950  PCP:  Dia Duval MD  PHONE:  [phone]    DATE: 2023      MEDICATIONS:  Prior to Admission medications    Medication Sig Start Date End Date Taking? Authorizing Provider   flecainide (TAMBOCOR) 100 MG tablet Take 1 tablet by mouth 2 times a day 22   Lynette Owusu APRN   metoprolol tartrate (LOPRESSOR) 12.5 MG half tablet Take 2 half tablet by mouth As Needed.    Provider, MD Krystyna   Xarelto 20 MG tablet TAKE ONE TABLET BY MOUTH EVERY DAY FOR BLOOD THINNER 9/15/22   Lynette Owusu APRN       Past medical & surgical history, social and family history reviewed in the electronic medical record.    Physical Exam:    Vitals:  125/67, hr 63, T 97, sat 100%    GENERAL: No apparent distress.  No significant changes since last exam.  CHEST: Clear to auscultation bilaterally no stridor no wheeze.  CV: S1, S2, regular without Murmurs, Rubs or Gallops  EXTREMITIES: No edema.                CrCl cannot be calculated (Patient's most recent lab result is older than the maximum 30 days allowed.).    IMPRESSION:Atrial Flutter       PLAN:  Procedure to perform: RFA of atrial fllutter        Electronically signed by EDYTA Michael, 23, 10:50 AM EDT.

## 2023-05-30 NOTE — OP NOTE
Cardiac Electrophysiology Procedure Note      [facility] at [facility]     CATHETER ABLATION FOR ATRIAL FLUTTER (Typical CTI Dependent)    PROCEDURES PERFORMED:   · Catheter ablation of atrial flutter  · Full diagnostic EP study  · 3D electroanatomic mapping  · drug infusion / programmed pacing    PREPROCEDURAL DIAGNOSES:  ·  Typical Atrial Flutter  · [id number] score of  2  · No history of atrial fibrillation    POST PROCEDURE DIANGOSES:  As above.    MODERATE SEDATION FOR PROCEDURE:    Moderate sedation was given during this procedure.    I supervised and directed RN to administer this sedation.  This staff member also monitored the patient's hemodynamic and respiratory status and response to these medications.  Please see the full detailed procedure report generated by the electrophysiology laboratory staff.  The patient tolerated moderate sedation well.  There were no complications regarding sedation.  The total dose of fentanyl was 250 mcg and the total dose of midazolam was 7 mg.  The total dose of Brevital was 120 mg .  First sedation was administered at 1228 and continued through 1:30 PM.    INDICATION FOR PROCEDURE:  Briefly, Analy Lin is a 73 y.o. year old female with a history of highly symptomatic recurrent atrial flutter.  No history of documented atrial fibrillation presents for elective catheter ablation of typical right atrial flutter.    ANTICOAGULATION STRATEGY PRIOR TO AND POST PROCEDURE: Xarelto 20 mg nightly with food. The last dose of anticoagulant was confirmed to have been taken last evening.      PT/INR:  No results found for: LABPROT, INR  PTT:  No results found for: APTT  CBC:   WBC   Date Value Ref Range Status   05/30/2023 3.40 3.40 - 10.80 10*3/mm3 Final     RBC   Date Value Ref Range Status   05/30/2023 3.99 3.77 - 5.28 10*6/mm3 Final     Hemoglobin   Date Value Ref Range Status   05/30/2023 12.7 12.0 - 15.9 g/dL Final     Hematocrit   Date Value Ref  "Range Status   05/30/2023 38.0 34.0 - 46.6 % Final     MCV   Date Value Ref Range Status   05/30/2023 95.2 79.0 - 97.0 fL Final     RDW   Date Value Ref Range Status   05/30/2023 12.3 12.3 - 15.4 % Final     Platelets   Date Value Ref Range Status   05/30/2023 205 140 - 450 10*3/mm3 Final     BMP:     Sodium   Date Value Ref Range Status   05/30/2023 145 136 - 145 mmol/L Final     Potassium   Date Value Ref Range Status   05/30/2023 3.9 3.5 - 5.2 mmol/L Final     Chloride   Date Value Ref Range Status   05/30/2023 108 (H) 98 - 107 mmol/L Final     CO2   Date Value Ref Range Status   05/30/2023 28.0 22.0 - 29.0 mmol/L Final     BUN   Date Value Ref Range Status   05/30/2023 13 8 - 23 mg/dL Final     Creatinine   Date Value Ref Range Status   05/30/2023 0.77 0.57 - 1.00 mg/dL Final     eGFR   Date Value Ref Range Status   05/30/2023 82.6 >60.0 mL/min/1.73 Final       Vital Signs: /94   Pulse (!) 141   Temp 97 °F (36.1 °C) (Temporal)   Resp 12   Ht 160 cm (63\")   Wt 86.3 kg (190 lb 3.2 oz)   SpO2 95%   BMI 33.69 kg/m²  O2 Flow Rate (L/min): 2 L/min   Admit Weight:  86.3 kg (190 lb 3.2 oz)  BMI: Body mass index is 33.69 kg/m².    PROCEDURE NARRATIVE:  The patient was able to give written informed consent after revisiting the key portions of the risk versus benefit profile of the procedure.  This discussion was framed by our lengthy conversations  (please see our detailed notes).  Patient verbalized strong understanding of this discussion and a strong desire to proceed with the procedure.  Please note that this detailed informed consent process utilized mutual and shared decision making process between all parties involved, principally the physician and patient, but also potentially with input from the patient's selected family and friends.    The patient was brought to the EP laboratory in the post absorptive state. The patient was then prepared and draped in a routing sterile fashion.  Seldinger access was " obtained at the right common femoral vein with two venipunctures.  J tip wires were advanced into the vascular space.  Short 9, 8 [place] sheaths and a single of deflectable sheath were placed into the right common femoral vein and the inferior vena cava / right atrium in an over the wire fashion.    An 8 [place] decapolar electrophysiology catheter was placed into the coronary sinus for left atrial pacing recording.  A [employer]   ablation catheter was placed into the right atrium right ventricle AV node his bundle position triangle of Medrano  do and used for pacing recording in all of the sites.the   This catheter was later used for catheter ablation along the cavo tricuspid isthmus.      An 8 [place] phased array intracardiac echocardiography probe was placed into the right atrium and right ventricle.  This was used to: a) exclude left atrial appendage thrombus, b) monitor the pericardial space for fluid, c) monitor catheter stability on the cavotricuspid isthmus, and d) ascertain CTI anatomy.    We used the [employer] 3 dimensional electroanatomic mapping system to reconstruct an electro-anatomic map of the right atrium AV node his bundle position and triangle of Medrano.    Patient entered the room in sinus rhythm.  Catheter ablation was  performed during left atrial pacing from the coronary sinus.    We proceeded with catheter ablation.  Ablation was performed along the cavo tricuspid isthmus beginning at the ventricular aspect in extended to the caval aspect of the cavo tricuspid isthmus.  Catheter ablation was performed with a maximum of 50 w power was performed along the isthmus until all electrograms were eliminated.      We then demonstrated that there was bidirectional block with differential pacing maneuvers.    We then gave intravenous adenosine to test for dormant conduction through the cavo tricuspid isthmus.  The dose was 12 mg.  After the adenosine effect was observed, we demonstrated that there was  bidirectional block with differential pacing maneuvers once again.    Please note that at this point we performed a diagnostic EP study.  Please note that it is impossible for a diagnostic EP study during atrial arrhythmias in this case specifically atrial flutter.  This is why the EP study was performed after the ablation.    Data obtained from this is listed in the below table:    Initial Study    Isuprel Washout Study            drive train / burst extrastim    QRS 95     Rhythm           Atrial CL      R-R 890     Ventricular CL           AVBCL      HV 53     AVNERP       drive train / burst extrastim   Slow Pway ERP      Rhythm     Fast Pway ERP      Atrial CL     VABCL conc? /  Dec?      Ventricular CL     VAERP      AVBCL 320     AERP      AVNERP 500  240    VERP      Slow Pway ERP     AP antegrade ERP      Fast Pway ERP     AP retrograde ERP      VABCL conc? /  Dec?           VAERP    Final Study      AERP      drive train / burst extrastim    VERP     Rhythm      AP antegrade ERP     Atrial CL      AP retrograde ERP     Ventricular CL           AVBCL     Isuprel Dose =      AVNERP       drive train / burst extrastim   Slow Pway ERP      Rhythm     Fast Pway ERP      Atrial CL     VABCL conc? /  Dec?      Ventricular CL     VAERP      AVBCL     AERP      AVNERP     VERP      Slow Pway ERP     AP antegrade ERP      Fast Pway ERP     AP retrograde ERP      VABCL conc? /  Dec?           VAERP           AERP           VERP           AP antegrade ERP           AP retrograde ERP                       Catheters and sheaths were then removed from the body.      Hemostasis was achieved with Vascade closure devices.  Bedrest x2 hours.    The patient transferred to recovery in stable condition.     COMPLICATIONS: none    EBL: minimal    RADIATION EXPOSURE:5 mGy over 2.1 minutes    KEY PROCEDURAL FINDINGS:    · Normal AV node and His-Purkinje system function  · Successful typical flutter ablation.   Bidirectional block.  · Recurrence of atrial fibrillation in recovery room.  This is the first documented episode of atrial fibrillation.  As a result continue flecainide.  Continue Xarelto.    POST PROCEDURAL PLAN:    ·  The patient will be observed with the usual recovery process  and then I anticipate that  the patient can likely be discharged home later today.  · Medications were reconciled, and key changes in medications include: As above  · The patient will be seen at our office in 3 months.  · Consider catheter ablation for paroxysmal atrial fibrillation should she have recurrent symptoms despite flecainide therapy.

## 2023-05-30 NOTE — Clinical Note
Replaced previous sheath in the right femoral vein. RFV SLO sheath exchanged for AGILIS sheath over wire

## 2023-05-30 NOTE — Clinical Note
Replaced previous sheath in the right femoral vein. RFV 9fr sheath exchanged for agilis sheath over wire

## 2023-05-30 NOTE — Clinical Note
Replaced previous sheath in the right femoral vein. RFV agilis sheath exchanged for 9fr short sheath over wire

## 2023-05-31 ENCOUNTER — CALL CENTER PROGRAMS (OUTPATIENT)
Dept: CALL CENTER | Facility: HOSPITAL | Age: 73
End: 2023-05-31

## 2023-05-31 NOTE — OUTREACH NOTE
PCI/Device Survey    Flowsheet Row Responses   Facility patient discharged from? [facility]   Procedure date 05/30/23   Procedure (if device, specify in description) Ablation   Performing MD Dr. Manpreet Montana   Attempt successful? Yes   Call start time 1118   Call end time 1120   Nursing interventions Patient education provided   Is the patient taking prescribed medications: --  [xarelto]   Nursing intervention Reminded to continue to take prescribed medications, Nurse provided patient education   Does the patient have any of the following symptoms related to the cath/surgical site? --  [right groin is healing well.No issues.]   Nursing intervention Patient education provided   Does the patient have an appointment scheduled with the cardiologist? Yes   Did the patient feel prepared to go home on the same day as the procedure? Yes   Is the patient satisfied with the same day discharge process? Yes   PCI/Device call completed Yes          Virginie BAINS - Registered Nurse

## 2023-06-08 ENCOUNTER — TELEPHONE (OUTPATIENT)
Dept: CARDIOLOGY | Facility: CLINIC | Age: 73
End: 2023-06-08
Payer: MEDICARE

## 2023-06-08 NOTE — TELEPHONE ENCOUNTER
Received cardiac clearance request from [facility] Physician [facility]. Patient is scheduled for total knee replacement on 6/26/23 with Dr. Marco Roberts. They are requesting patient hold Xarelto x5 days prior.      (f) [phone]

## 2023-06-15 DIAGNOSIS — R00.2 PALPITATIONS: ICD-10-CM

## 2023-06-15 DIAGNOSIS — I47.20 VENTRICULAR TACHYCARDIA: ICD-10-CM

## 2023-06-15 RX ORDER — FLECAINIDE ACETATE 100 MG/1
TABLET ORAL
Qty: 180 TABLET | Refills: 3 | Status: SHIPPED | OUTPATIENT
Start: 2023-06-15

## 2023-06-18 LAB
QT INTERVAL: 348 MS
QTC INTERVAL: 522 MS

## 2023-07-24 ENCOUNTER — TELEPHONE (OUTPATIENT)
Dept: CARDIOLOGY | Facility: CLINIC | Age: 73
End: 2023-07-24
Payer: MEDICARE

## 2023-07-24 NOTE — TELEPHONE ENCOUNTER
Received cardiac clearance request from  stating pt has total knee replacement scheduled for 08/28/2023 and is requiring a cardiac clearance. Placed cardiac clearance request in Larisa's inbox to review and address with provider.

## 2023-07-24 NOTE — LETTER
July 27, 2023       No Recipients    Patient: Analy Lin   YOB: 1950   Date of Visit: 7/24/2023 July 27, 2023       To Whom It May Concern:    We build our practice on integrity and patient care. The highest compliment we can receive is the referral of friends, family and patients to our office. We want to take this time to thank you for considering our group as part of your care team.    The medical records for Analy Lin 1950 were reviewed for pre-operative clearance on 07/27/2023. It has been determined that this patient has:  ACCEPTABLE RISK    Analy Lin is currently on the following medications that will need to be held prior to surgery: MAY HOLD XARELTO 3 DAYS PRIOR TO PROCEDURE.    This pre-operative evaluation has been completed based on patient reported medical conditions at the date of this letter, this evaluation may have considered in part results of additional testing, completion of an EKG and patient reported symptoms at the time of their visit.    Analy Lin's pre-operative clearance is good for thirty(30) days from the date of this letter with no reported changes in health, symptoms or diagnosis from the patient, their primary care provider or your office.    Your office has reported the patient will under go TOTAL KNEE REPLACEMENT on 08/28/2023 with Dr. GRANADOS. If this appointment is changed or moved outside of thirty(30) days from 07/27/2023 this pre-operative clearance is void.    If you have any further questions please give our office a call.    Thank you for your trust,      EDYTA Muro

## 2023-07-25 ENCOUNTER — TELEPHONE (OUTPATIENT)
Dept: CARDIOLOGY | Facility: CLINIC | Age: 73
End: 2023-07-25
Payer: MEDICARE

## 2023-07-26 NOTE — TELEPHONE ENCOUNTER
Dr. Roberts is requesting to hold Xarelto 3-5 days prior to the patient having a total knee replacement.        [facility] Physician [facility]  (P)[phone]  (F)[phone]

## 2023-07-28 ENCOUNTER — OFFICE VISIT (OUTPATIENT)
Dept: CARDIOLOGY | Facility: CLINIC | Age: 73
End: 2023-07-28
Payer: MEDICARE

## 2023-07-28 VITALS
OXYGEN SATURATION: 96 % | HEIGHT: 63 IN | BODY MASS INDEX: 33.31 KG/M2 | WEIGHT: 188 LBS | SYSTOLIC BLOOD PRESSURE: 118 MMHG | DIASTOLIC BLOOD PRESSURE: 62 MMHG | HEART RATE: 65 BPM

## 2023-07-28 DIAGNOSIS — I47.29 NSVT (NONSUSTAINED VENTRICULAR TACHYCARDIA): Primary | ICD-10-CM

## 2023-07-28 NOTE — PROGRESS NOTES
"        Encounter Date:07/28/2023      Patient ID: Analy Lin is a 73 y.o. female.    Dia Duval MD    Chief Complaint: Atrial Flutter      PROBLEM LIST:  Patient Active Problem List    Diagnosis Date Noted    Atrial flutter 10/28/2022     Priority: High     Note Last Updated: 7/28/2023     Catheter ablation of typical atrial flutter, 5/30/2023      NSVT (nonsustained ventricular tachycardia) 09/23/2021     Priority: High     Note Last Updated: 10/27/2022     MCOT 6/22/2021 through 7/5/2021: Sinus rhythm with single 4 beat run VT, asymptomatic  Echocardiogram, 8/24/2021: Normal LV, EF 55 to 60%.  Normal valvular morphology.  Left atrium 3.6 cm  GXT stress test, 8/24/2021: Achieved 7 METS, no EKG changes.  PVCs noted during exercise.      PVC (premature ventricular contraction) 09/23/2021     Priority: Medium    Primary hypertension 04/25/2023     Priority: Low    Grade I diastolic dysfunction 09/23/2021     Priority: Low    Arthritis     History of COVID-19 08/15/2022    Seasonal allergies 06/14/2021    GERD (gastroesophageal reflux disease) 06/14/2021               History of Present Illness  Patient presents today for follow-up with a history of     No Known Allergies    Current Outpatient Medications   Medication Instructions    flecainide (TAMBOCOR) 100 MG tablet TAKE ONE TABLET BY MOUTH TWICE DAILY FOR HEART    metoprolol tartrate (LOPRESSOR) 25 mg, Oral, 3 Times Daily PRN    rivaroxaban (XARELTO) 20 mg, Oral, Daily With Dinner       .    Objective:     /62 (BP Location: Left arm, Patient Position: Sitting)   Pulse 65   Ht 160 cm (63\")   Wt 85.3 kg (188 lb)   SpO2 96%   BMI 33.30 kg/mý    Body mass index is 33.3 kg/mý.     Physical Exam    Lab Review:                           Procedures               Assessment:      Diagnosis Plan   1. NSVT (nonsustained ventricular tachycardia)          Plan:     Stable cardiac status.  Continue current medications.   {CARDIO RETURN TO " [facility]:[id number]}, sooner as needed.  Thank you for allowing us to participate in the care of your patient.     ***

## 2023-10-30 ENCOUNTER — OFFICE VISIT (OUTPATIENT)
Dept: CARDIOLOGY | Facility: CLINIC | Age: 73
End: 2023-10-30
Payer: MEDICARE

## 2023-10-30 VITALS
DIASTOLIC BLOOD PRESSURE: 54 MMHG | BODY MASS INDEX: 33.68 KG/M2 | OXYGEN SATURATION: 96 % | SYSTOLIC BLOOD PRESSURE: 132 MMHG | HEIGHT: 62 IN | HEART RATE: 89 BPM | WEIGHT: 183 LBS

## 2023-10-30 DIAGNOSIS — I48.92 ATRIAL FLUTTER, UNSPECIFIED TYPE: Primary | ICD-10-CM

## 2023-10-30 DIAGNOSIS — R00.2 PALPITATIONS: ICD-10-CM

## 2023-10-30 DIAGNOSIS — I34.0 MITRAL VALVE INSUFFICIENCY, UNSPECIFIED ETIOLOGY: ICD-10-CM

## 2023-10-30 PROCEDURE — 3075F SYST BP GE 130 - 139MM HG: CPT | Performed by: PHYSICIAN ASSISTANT

## 2023-10-30 PROCEDURE — 99213 OFFICE O/P EST LOW 20 MIN: CPT | Performed by: PHYSICIAN ASSISTANT

## 2023-10-30 PROCEDURE — 3078F DIAST BP <80 MM HG: CPT | Performed by: PHYSICIAN ASSISTANT

## 2023-10-30 NOTE — LETTER
October 30, 2023       No Recipients    Patient: Analy Lin   YOB: 1950   Date of Visit: 10/30/2023       Dear Dia Duval MD    Analy Lin was in my office today. Below is a copy of my note.    If you have questions, please do not hesitate to call me. I look forward to following Analy along with you.         Sincerely,        EDYTA Miles        CC:   No Recipients    Problem list     Subjective  Analy Lin is a 73 y.o. female     Chief Complaint   Patient presents with   • Follow-up     6 months   Problem list:  1.  Atrial flutter  1.1 diagnosed by EKG with patient currently on flecainide for rhythm control and Xarelto for anticoagulation  1.2 following with EP services considering an ablation  2.  Palpitations  2.1 event monitor in 2021 demonstrating PVCs and nonsustained V. tach (4 beats)  3.  Preserved systolic function echocardiogram in 2021  4.  GERD       HPI    Patient is a 73-year-old female who presents to the office to be evaluated.    She has history of atrial flutter and has underwent ablation.  She has been on flecainide therapy for rhythm suppression and anticoagulation with Xarelto.    She has done well.  She occasionally might sense breakthrough flutter sensations but has done well on flecainide therapy.  She has no chest pain or pressure. She does not describe dyspnea.  No PND or orthopnea.    She does not describe any symptoms of blood loss on Xarelto.  She has done relatively well otherwise.      Current Outpatient Medications on File Prior to Visit   Medication Sig Dispense Refill   • flecainide (TAMBOCOR) 100 MG tablet TAKE ONE TABLET BY MOUTH TWICE DAILY FOR HEART 180 tablet 3   • metoprolol tartrate (LOPRESSOR) 25 MG tablet Take 1 tablet by mouth 3 (Three) Times a Day As Needed (AFib). 180 tablet 3   • rivaroxaban (XARELTO) 20 MG tablet Take 1 tablet by mouth Daily With Dinner. 90 tablet 3     No current facility-administered medications on file  "prior to visit.       Patient has no known allergies.    Past Medical History:   Diagnosis Date   • Arrhythmia    • Arthritis    • Atrial fibrillation    • COVID-19 vaccine administered 1st - 03/05/2021    2nd -  03/26/2021 - Pfzier  11/04/2021   • Mitral valve insufficiency 10/30/2023   • Shoulder injury     left   • Tear of meniscus of knee        Social History     Socioeconomic History   • Marital status:    Tobacco Use   • Smoking status: Never   • Smokeless tobacco: Never   Vaping Use   • Vaping Use: Never used   Substance and Sexual Activity   • Alcohol use: Yes     Alcohol/week: 1.0 standard drink of alcohol     Types: 1 Glasses of wine per week     Comment: socially (twice weekly)   • Drug use: No   • Sexual activity: Not Currently     Partners: Male       Family History   Problem Relation Age of Onset   • Cancer Other    • No Known Problems Mother    • Cancer Father    • Hypertension Father        Review of Systems   Constitutional: Negative.    HENT: Negative.     Eyes: Negative.  Negative for visual disturbance.   Respiratory: Negative.  Negative for apnea, cough, chest tightness, shortness of breath and wheezing.    Cardiovascular:  Positive for leg swelling. Negative for chest pain and palpitations.   Gastrointestinal: Negative.    Musculoskeletal: Negative.    Skin: Negative.  Negative for color change, rash and wound.   Neurological:  Negative for dizziness, syncope, weakness, light-headedness, numbness and headaches.   Hematological: Negative.  Does not bruise/bleed easily.   Psychiatric/Behavioral: Negative.  Negative for sleep disturbance.        Objective  Vitals:    10/30/23 0907   BP: 132/54   Pulse: 89   SpO2: 96%   Weight: 83 kg (183 lb)   Height: 157.5 cm (62\")      /54   Pulse 89   Ht 157.5 cm (62\")   Wt 83 kg (183 lb)   SpO2 96%   BMI 33.47 kg/m²     Lab Results (most recent)       None            Physical Exam  Vitals and nursing note reviewed.   Constitutional:       " General: She is not in acute distress.     Appearance: Normal appearance. She is well-developed.   HENT:      Head: Normocephalic and atraumatic.   Eyes:      General: No scleral icterus.        Right eye: No discharge.         Left eye: No discharge.      Conjunctiva/sclera: Conjunctivae normal.   Neck:      Vascular: No carotid bruit.   Cardiovascular:      Rate and Rhythm: Normal rate and regular rhythm.      Heart sounds: Normal heart sounds. No murmur heard.     No friction rub. No gallop.   Pulmonary:      Effort: Pulmonary effort is normal. No respiratory distress.      Breath sounds: Normal breath sounds. No wheezing or rales.   Chest:      Chest wall: No tenderness.   Musculoskeletal:      Right lower leg: No edema.      Left lower leg: No edema.   Skin:     General: Skin is warm and dry.      Coloration: Skin is not pale.      Findings: No erythema or rash.   Neurological:      Mental Status: She is alert and oriented to person, place, and time.      Cranial Nerves: No cranial nerve deficit.   Psychiatric:         Behavior: Behavior normal.         Procedure  Procedures       Assessment & Plan    Problems Addressed this Visit          Cardiac and Vasculature    Atrial flutter - Primary    Mitral valve insufficiency    Palpitations     Diagnoses         Codes Comments    Atrial flutter, unspecified type    -  Primary ICD-10-CM: I48.92  ICD-9-CM: 427.32     Mitral valve insufficiency, unspecified etiology     ICD-10-CM: I34.0  ICD-9-CM: 424.0     Palpitations     ICD-10-CM: R00.2  ICD-9-CM: 785.1           Recommendation  1.  Patient is a 73-year-old female with history of atrial flutter status post ablation.  She has done well.  She is on flecainide therapy and anticoagulation with Xarelto.  We will continue.    2.  She occasionally has breakthrough palpitations but also had premature beats.  For now, we will monitor.  It appears stable.    3.  Patient with mild valve disease with mild mitral regurgitation.   For now, no physical No clinical history to suggest worsening valvular function.  We will monitor.    4.  We will see her back for follow-up in 6 months to year.  We will see her sooner as symptoms discussed.  Follow-up with primary and EP services as scheduled.         Advance Care Planning  ACP discussion was declined by the patient. Patient has an advance directive in EMR which is still valid.     Patient did not bring med list or medicine bottles to appointment, med list has been reviewed and updated based on patient's knowledge of their meds.        Electronically signed by:

## 2023-10-30 NOTE — PROGRESS NOTES
Problem list     Subjective   Analy Lin is a 73 y.o. female     Chief Complaint   Patient presents with    Follow-up     6 months   Problem list:  1.  Atrial flutter  1.1 diagnosed by EKG with patient currently on flecainide for rhythm control and Xarelto for anticoagulation  1.2 following with EP services considering an ablation  2.  Palpitations  2.1 event monitor in 2021 demonstrating PVCs and nonsustained V. tach (4 beats)  3.  Preserved systolic function echocardiogram in 2021  4.  GERD       HPI    Patient is a 73-year-old female who presents to the office to be evaluated.    She has history of atrial flutter and has underwent ablation.  She has been on flecainide therapy for rhythm suppression and anticoagulation with Xarelto.    She has done well.  She occasionally might sense breakthrough flutter sensations but has done well on flecainide therapy.  She has no chest pain or pressure. She does not describe dyspnea.  No PND or orthopnea.    She does not describe any symptoms of blood loss on Xarelto.  She has done relatively well otherwise.      Current Outpatient Medications on File Prior to Visit   Medication Sig Dispense Refill    flecainide (TAMBOCOR) 100 MG tablet TAKE ONE TABLET BY MOUTH TWICE DAILY FOR HEART 180 tablet 3    metoprolol tartrate (LOPRESSOR) 25 MG tablet Take 1 tablet by mouth 3 (Three) Times a Day As Needed (AFib). 180 tablet 3    rivaroxaban (XARELTO) 20 MG tablet Take 1 tablet by mouth Daily With Dinner. 90 tablet 3     No current facility-administered medications on file prior to visit.       Patient has no known allergies.    Past Medical History:   Diagnosis Date    Arrhythmia     Arthritis     Atrial fibrillation     COVID-19 vaccine administered 1st - 03/05/2021    2nd -  03/26/2021 - [facility]  11/04/2021    Mitral valve insufficiency 10/30/2023    Shoulder injury     left    Tear of meniscus of knee        Social History     Socioeconomic History    Marital status:   "  Tobacco Use    Smoking status: Never    Smokeless tobacco: Never   Vaping Use    Vaping Use: Never used   Substance and Sexual Activity    Alcohol use: Yes     Alcohol/week: 1.0 standard drink of alcohol     Types: 1 Glasses of wine per week     Comment: socially (twice weekly)    Drug use: No    Sexual activity: Not Currently     Partners: Male       Family History   Problem Relation Age of Onset    Cancer Other     No Known Problems Mother     Cancer Father     Hypertension Father        Review of Systems   Constitutional: Negative.    HENT: Negative.     Eyes: Negative.  Negative for visual disturbance.   Respiratory: Negative.  Negative for apnea, cough, chest tightness, shortness of breath and wheezing.    Cardiovascular:  Positive for leg swelling. Negative for chest pain and palpitations.   Gastrointestinal: Negative.    Musculoskeletal: Negative.    Skin: Negative.  Negative for color change, rash and wound.   Neurological:  Negative for dizziness, syncope, weakness, light-headedness, numbness and headaches.   Hematological: Negative.  Does not bruise/bleed easily.   Psychiatric/Behavioral: Negative.  Negative for sleep disturbance.        Objective   Vitals:    10/30/23 0907   BP: 132/54   Pulse: 89   SpO2: 96%   Weight: 83 kg (183 lb)   Height: 157.5 cm (62\")      /54   Pulse 89   Ht 157.5 cm (62\")   Wt 83 kg (183 lb)   SpO2 96%   BMI 33.47 kg/m²     Lab Results (most recent)       None            Physical Exam  Vitals and nursing note reviewed.   Constitutional:       General: She is not in acute distress.     Appearance: Normal appearance. She is well-developed.   HENT:      Head: Normocephalic and atraumatic.   Eyes:      General: No scleral icterus.        Right eye: No discharge.         Left eye: No discharge.      Conjunctiva/sclera: Conjunctivae normal.   Neck:      Vascular: No carotid bruit.   Cardiovascular:      Rate and Rhythm: Normal rate and regular rhythm.      Heart sounds: " Normal heart sounds. No murmur heard.     No friction rub. No gallop.   Pulmonary:      Effort: Pulmonary effort is normal. No respiratory distress.      Breath sounds: Normal breath sounds. No wheezing or rales.   Chest:      Chest wall: No tenderness.   Musculoskeletal:      Right lower leg: No edema.      Left lower leg: No edema.   Skin:     General: Skin is warm and dry.      Coloration: Skin is not pale.      Findings: No erythema or rash.   Neurological:      Mental Status: She is alert and oriented to person, place, and time.      Cranial Nerves: No cranial nerve deficit.   Psychiatric:         Behavior: Behavior normal.         Procedure   Procedures       Assessment & Plan     Problems Addressed this Visit          Cardiac and Vasculature    Atrial flutter - Primary    Mitral valve insufficiency    Palpitations     Diagnoses         Codes Comments    Atrial flutter, unspecified type    -  Primary ICD-10-CM: I48.92  ICD-9-CM: 427.32     Mitral valve insufficiency, unspecified etiology     ICD-10-CM: I34.0  ICD-9-CM: 424.0     Palpitations     ICD-10-CM: R00.2  ICD-9-CM: 785.1           Recommendation  1.  Patient is a 73-year-old female with history of atrial flutter status post ablation.  She has done well.  She is on flecainide therapy and anticoagulation with Xarelto.  We will continue.    2.  She occasionally has breakthrough palpitations but also had premature beats.  For now, we will monitor.  It appears stable.    3.  Patient with mild valve disease with mild mitral regurgitation.  For now, no physical No clinical history to suggest worsening valvular function.  We will monitor.    4.  We will see her back for follow-up in 6 months to year.  We will see her sooner as symptoms discussed.  Follow-up with primary and EP services as scheduled.         Advance Care Planning   ACP discussion was declined by the patient. Patient has an advance directive in EMR which is still valid.     Patient did not bring  med list or medicine bottles to appointment, med list has been reviewed and updated based on patient's knowledge of their meds.        Electronically signed by:

## 2024-02-23 ENCOUNTER — OFFICE VISIT (OUTPATIENT)
Dept: CARDIOLOGY | Facility: CLINIC | Age: 74
End: 2024-02-23
Payer: MEDICARE

## 2024-02-23 VITALS
OXYGEN SATURATION: 96 % | DIASTOLIC BLOOD PRESSURE: 72 MMHG | HEIGHT: 63 IN | BODY MASS INDEX: 32.07 KG/M2 | WEIGHT: 181 LBS | SYSTOLIC BLOOD PRESSURE: 130 MMHG | HEART RATE: 69 BPM

## 2024-02-23 DIAGNOSIS — I48.3 TYPICAL ATRIAL FLUTTER: ICD-10-CM

## 2024-02-23 DIAGNOSIS — Z79.01 CHRONIC ANTICOAGULATION: ICD-10-CM

## 2024-02-23 DIAGNOSIS — R00.2 PALPITATIONS: ICD-10-CM

## 2024-02-23 DIAGNOSIS — I47.29 NSVT (NONSUSTAINED VENTRICULAR TACHYCARDIA): Primary | ICD-10-CM

## 2024-02-23 DIAGNOSIS — I10 PRIMARY HYPERTENSION: ICD-10-CM

## 2024-02-23 DIAGNOSIS — R07.89 OTHER CHEST PAIN: ICD-10-CM

## 2024-02-23 DIAGNOSIS — Z79.899 LONG TERM CURRENT USE OF ANTIARRHYTHMIC DRUG: ICD-10-CM

## 2024-02-23 RX ORDER — NITROGLYCERIN 0.3 MG/1
TABLET SUBLINGUAL
Qty: 25 TABLET | Refills: 0 | Status: SHIPPED | OUTPATIENT
Start: 2024-02-23

## 2024-02-23 RX ORDER — ACYCLOVIR 800 MG/1
800 TABLET ORAL
COMMUNITY
Start: 2024-02-15

## 2024-02-23 RX ORDER — FLECAINIDE ACETATE 100 MG/1
100 TABLET ORAL 2 TIMES DAILY
Qty: 180 TABLET | Refills: 3 | Status: SHIPPED | OUTPATIENT
Start: 2024-02-23

## 2024-02-23 RX ORDER — ALBUTEROL SULFATE 90 UG/1
2 AEROSOL, METERED RESPIRATORY (INHALATION) AS NEEDED
COMMUNITY
Start: 2024-02-15

## 2024-02-23 NOTE — PROGRESS NOTES
Encounter Date:02/23/2024      Patient ID: Analy Lin is a 74 y.o. female.    Dia Duval MD    Cheif Complaint EP: Atrial Flutter    PROBLEM LIST:  Patient Active Problem List    Diagnosis Date Noted    Atrial flutter 10/28/2022     Priority: High     Note Last Updated: 7/28/2023     Catheter ablation of typical atrial flutter, 5/30/2023      NSVT (nonsustained ventricular tachycardia) 09/23/2021     Priority: High     Note Last Updated: 10/27/2022     MCOT 6/22/2021 through 7/5/2021: Sinus rhythm with single 4 beat run VT, asymptomatic  Echocardiogram, 8/24/2021: Normal LV, EF 55 to 60%.  Normal valvular morphology.  Left atrium 3.6 cm  GXT stress test, 8/24/2021: Achieved 7 METS, no EKG changes.  PVCs noted during exercise.      PVC (premature ventricular contraction) 09/23/2021     Priority: Medium    Long term current use of antiarrhythmic drug 02/23/2024     Priority: Low    Chronic anticoagulation 02/23/2024     Priority: Low    Primary hypertension 04/25/2023     Priority: Low    Arthritis     History of COVID-19 08/15/2022    Seasonal allergies 06/14/2021    GERD (gastroesophageal reflux disease) 06/14/2021               History of Present Illness  Patient presents today for follow-up with a history of atrial flutter status post flutter ablation on long-term antiarrhythmic and chronic anticoagulation.  She returns today for scheduled electrophysiology follow-up.  She has done fairly well since her flutter ablation.  She states that she had 3 episodes of atrial fibrillation last August after knee replacement surgery.  This was documented on her Apple Watch.  She states she took 2 extra flecainide which terminated her symptoms.  She does not check her blood pressure at home.  She states compliance with the current medical regimen reports no significant adverse side effects.  She informs me today that she has had 2-3 episodes over the past several months of right-sided chest pain which  "radiates up her neck, is not related to exertion, may last for 15 minutes and is spasm-like in character.    No Known Allergies    Current Outpatient Medications   Medication Instructions    acyclovir (ZOVIRAX) 800 mg, Oral, 5 Times Daily    albuterol sulfate  (90 Base) MCG/ACT inhaler 2 puffs, Inhalation, As Needed    flecainide (TAMBOCOR) 100 MG tablet TAKE ONE TABLET BY MOUTH TWICE DAILY FOR HEART    metoprolol tartrate (LOPRESSOR) 25 mg, Oral, 3 Times Daily PRN    rivaroxaban (XARELTO) 20 mg, Oral, Daily With Dinner       .    Objective:     /72 (BP Location: Left arm, Patient Position: Sitting)   Pulse 69   Ht 160 cm (63\")   Wt 82.1 kg (181 lb)   SpO2 96%   BMI 32.06 kg/m²    Body mass index is 32.06 kg/m².     Constitutional:       Appearance: Well-developed.   Pulmonary:      Effort: Pulmonary effort is normal. No respiratory distress.      Breath sounds: Normal breath sounds. No wheezing. No rales.      Comments: Bases clear  Chest:      Chest wall: Not tender to palpatation.   Cardiovascular:      Normal rate. Regular rhythm.      Murmurs: There is no murmur.      No gallop.  No click. No rub.   Pulses:     Intact distal pulses.   Edema:     Peripheral edema absent.   Musculoskeletal: Normal range of motion.       Lab Review:     Lab Results   Component Value Date    GLUCOSE 101 (H) 05/30/2023    BUN 13 05/30/2023    CREATININE 0.77 05/30/2023    EGFR 82.6 05/30/2023    BCR 16.9 05/30/2023    K 3.9 05/30/2023    CO2 28.0 05/30/2023    CALCIUM 9.8 05/30/2023    ALBUMIN 4.30 03/29/2022    BILITOT 0.4 03/29/2022    AST 21 03/29/2022    ALT 24 03/29/2022     Lab Results   Component Value Date    WBC 3.40 05/30/2023    HGB 12.7 05/30/2023    HCT 38.0 05/30/2023    MCV 95.2 05/30/2023     05/30/2023     Lab Results   Component Value Date    TSH 2.020 06/09/2022           Procedures               Assessment:      Diagnosis Plan   1. NSVT (nonsustained ventricular tachycardia)  Possible " source of her occasional palpitations.  Continue current medical regimen      2. Typical atrial flutter  Status post flutter ablation.      3. Primary hypertension  Well-managed on current medical regimen      4. Other chest pain  Symptoms are very atypical for angina and sounds more consistent with either muscle skeletal chest pain or esophageal spasm.  I am prescribing nitroglycerin 0.3 mg sublingual as needed as this will also be effective for esophageal spasm.  I have encouraged her to follow-up with her primary care physician for possible referral to gastroenterology.  She also expresses some dysphagia symptoms and has a known history of hiatal hernia.   5.     Chronic anticoagulation     I would like her to stay on chronic anticoagulation given possibility that she has had episodes of atrial fibrillation.  I have asked her to document any further episodes of atrial fibrillation on her watch and forward documents to our office.  Refilled Xarelto at current dose    6.     Chronic antiarrhythmic     refilled flecainide at current dose     Plan:     Stable cardiac status.  Continue current medications.   in 12 months, sooner as needed.  Thank you for allowing us to participate in the care of your patient.     Electronically signed by EDYTA Michael, 02/23/24, 4:29 PM EST.

## 2024-04-18 DIAGNOSIS — R00.2 PALPITATIONS: ICD-10-CM

## 2024-04-18 RX ORDER — FLECAINIDE ACETATE 100 MG/1
TABLET ORAL
Qty: 180 TABLET | Refills: 0 | Status: SHIPPED | OUTPATIENT
Start: 2024-04-18

## 2024-04-30 ENCOUNTER — OFFICE VISIT (OUTPATIENT)
Dept: CARDIOLOGY | Facility: CLINIC | Age: 74
End: 2024-04-30
Payer: MEDICARE

## 2024-04-30 VITALS
BODY MASS INDEX: 33.49 KG/M2 | SYSTOLIC BLOOD PRESSURE: 131 MMHG | WEIGHT: 189 LBS | OXYGEN SATURATION: 98 % | HEIGHT: 63 IN | DIASTOLIC BLOOD PRESSURE: 69 MMHG | HEART RATE: 60 BPM

## 2024-04-30 DIAGNOSIS — I48.92 ATRIAL FLUTTER, UNSPECIFIED TYPE: Primary | ICD-10-CM

## 2024-04-30 DIAGNOSIS — R07.9 CHEST PAIN, UNSPECIFIED TYPE: ICD-10-CM

## 2024-04-30 DIAGNOSIS — R00.2 PALPITATIONS: ICD-10-CM

## 2024-04-30 PROCEDURE — 3075F SYST BP GE 130 - 139MM HG: CPT | Performed by: PHYSICIAN ASSISTANT

## 2024-04-30 PROCEDURE — 3078F DIAST BP <80 MM HG: CPT | Performed by: PHYSICIAN ASSISTANT

## 2024-04-30 PROCEDURE — 99214 OFFICE O/P EST MOD 30 MIN: CPT | Performed by: PHYSICIAN ASSISTANT

## 2024-04-30 NOTE — PROGRESS NOTES
Problem list     Subjective   Analy Lin is a 74 y.o. female     Chief Complaint   Patient presents with    Follow-up     6 months     Problem list:  1.  Atrial flutter  1.1 diagnosed by EKG with patient currently on flecainide for rhythm control and Xarelto for anticoagulation  1.2 radiofrequency ablation of atrial flutter May 2023 by Dr. Montana.  Atrial fibrillation was noted in recovery  1.3 patient continued on flecainide therapy and Xarelto for anticoagulation  2.  Palpitations  2.1 event monitor in 2021 demonstrating PVCs and nonsustained V. tach (4 beats)  3.  Preserved systolic function echocardiogram in 2021  4.  GERD    HPI    Patient is a 74-year-old female who presents to the office to be evaluated.  She has done well.  She describes an episode a few weeks ago when she felt a discomfort that seem to occur in her upper abdomen and radiate upward into her neck.  She thought it was GI related and was told by her primary that she would like for her to have an endoscopy.  She has not had that done.    She describes having this 3-4 times.  She has not had it since.  She feels well.  She complains of no discomfort.  No shortness of breath.  No PND or orthopnea.    She occasionally has breakthrough palpitations but is taking flecainide.  She apparently takes metoprolol on an as-needed basis.  She follows with EP services as well.  She does not describe any dizziness, presyncope, or syncope.  She does not describe any symptoms of bleeding on anticoagulation and is doing well.      Current Outpatient Medications on File Prior to Visit   Medication Sig Dispense Refill    albuterol sulfate  (90 Base) MCG/ACT inhaler Inhale 2 puffs As Needed.      flecainide (TAMBOCOR) 100 MG tablet TAKE 1 TABLET BY MOUTH TWICE DAILY for heart 180 tablet 0    metoprolol tartrate (LOPRESSOR) 25 MG tablet Take 1 tablet by mouth 3 (Three) Times a Day As Needed (AFib). (Patient taking differently: Take 1 tablet by mouth As  Needed (AFib).) 180 tablet 3    nitroglycerin (NITROSTAT) 0.3 MG SL tablet 1 under the tongue as needed for angina, may repeat q5mins for up three doses 25 tablet 0    rivaroxaban (XARELTO) 20 MG tablet Take 1 tablet by mouth Daily With Dinner. 90 tablet 3    [DISCONTINUED] acyclovir (ZOVIRAX) 800 MG tablet Take 1 tablet by mouth 5 (Five) Times a Day.       No current facility-administered medications on file prior to visit.       Patient has no known allergies.    Past Medical History:   Diagnosis Date    Arrhythmia     Arthritis     Atrial fibrillation     COVID-19 vaccine administered 1st - 03/05/2021    2nd -  03/26/2021 - [facility]  11/04/2021    Mitral valve insufficiency 10/30/2023    Shoulder injury     left    Tear of meniscus of knee        Social History     Socioeconomic History    Marital status:    Tobacco Use    Smoking status: Never    Smokeless tobacco: Never   Vaping Use    Vaping status: Never Used   Substance and Sexual Activity    Alcohol use: Yes     Alcohol/week: 1.0 standard drink of alcohol     Types: 1 Glasses of wine per week     Comment: socially (twice weekly)    Drug use: No    Sexual activity: Not Currently     Partners: Male       Family History   Problem Relation Age of Onset    Cancer Other     No Known Problems Mother     Cancer Father     Hypertension Father        Review of Systems   Constitutional:  Negative for activity change, appetite change, chills, fatigue and fever.   HENT: Negative.  Negative for congestion.    Eyes: Negative.  Negative for visual disturbance.   Respiratory: Negative.  Negative for apnea, cough, chest tightness, shortness of breath and wheezing.    Cardiovascular:  Positive for chest pain and palpitations. Negative for leg swelling.   Gastrointestinal: Negative.  Negative for blood in stool.   Endocrine: Negative.  Negative for cold intolerance and heat intolerance.   Genitourinary: Negative.  Negative for hematuria.   Musculoskeletal: Negative.   "Negative for gait problem.   Skin: Negative.  Negative for color change, rash and wound.   Allergic/Immunologic: Positive for environmental allergies.   Neurological:  Positive for dizziness. Negative for syncope, weakness, light-headedness, numbness and headaches.   Hematological: Negative.  Does not bruise/bleed easily.   Psychiatric/Behavioral: Negative.  Negative for sleep disturbance.        Objective   Vitals:    04/30/24 0932   BP: 131/69   BP Location: Right arm   Patient Position: Sitting   Cuff Size: Adult   Pulse: 60   SpO2: 98%   Weight: 85.7 kg (189 lb)   Height: 160 cm (63\")      /69 (BP Location: Right arm, Patient Position: Sitting, Cuff Size: Adult)   Pulse 60   Ht 160 cm (63\")   Wt 85.7 kg (189 lb)   SpO2 98%   BMI 33.48 kg/m²     Lab Results (most recent)       None            Physical Exam  Vitals and nursing note reviewed.   Constitutional:       General: She is not in acute distress.     Appearance: Normal appearance. She is well-developed.   HENT:      Head: Normocephalic and atraumatic.   Eyes:      General: No scleral icterus.        Right eye: No discharge.         Left eye: No discharge.      Conjunctiva/sclera: Conjunctivae normal.   Neck:      Vascular: No carotid bruit.   Cardiovascular:      Rate and Rhythm: Normal rate and regular rhythm.      Heart sounds: Normal heart sounds. No murmur heard.     No friction rub. No gallop.   Pulmonary:      Effort: Pulmonary effort is normal. No respiratory distress.      Breath sounds: Normal breath sounds. No wheezing or rales.   Chest:      Chest wall: No tenderness.   Musculoskeletal:      Right lower leg: No edema.      Left lower leg: No edema.   Skin:     General: Skin is warm and dry.      Coloration: Skin is not pale.      Findings: No erythema or rash.   Neurological:      Mental Status: She is alert and oriented to person, place, and time.      Cranial Nerves: No cranial nerve deficit.   Psychiatric:         Behavior: Behavior " normal.         Procedure   Procedures       Assessment & Plan     Problems Addressed this Visit          Cardiac and Vasculature    Atrial flutter - Primary    Chest pain    Palpitations     Diagnoses         Codes Comments    Atrial flutter, unspecified type    -  Primary ICD-10-CM: I48.92  ICD-9-CM: 427.32     Palpitations     ICD-10-CM: R00.2  ICD-9-CM: 785.1     Chest pain, unspecified type     ICD-10-CM: R07.9  ICD-9-CM: 786.50           Recommendation  1.  Patient is a 74-year-old female status post ablation of atrial flutter doing well on anticoagulation.  She is on rhythm suppressive medicine.  We will continue.    2.  She may have occasional atrial fibrillation and breakthrough palpitations.  She is doing well we can monitor.  If symptoms were to worsen, she was instructed to call the office.    3.  Patient describes an atypical pain that seems to originate in the epigastric region and radiated up into her neck.  She feels it is GI related.  She has not had it in some time.  We discussed repeat testing but she would like to monitor.  If symptoms were to return or increase as discussed with her today, I want her to call the office.    4.  Otherwise, she is doing well.  We can see her back for follow-up in 6 months or sooner if needed.  Follow-up with primary and EP services as scheduled.           Patient did not bring med list or medicine bottles to appointment, med list has been reviewed and updated based on patient's knowledge of their meds.      Advance Care Planning   ACP discussion was declined by the patient. Patient has an advance directive in EMR which is still valid.       Electronically signed by:

## 2024-04-30 NOTE — LETTER
April 30, 2024       No Recipients    Patient: Analy Lin   YOB: 1950   Date of Visit: 4/30/2024       Dear MARTA Milton    Analy Lin was in my office today. Below is a copy of my note.    If you have questions, please do not hesitate to call me. I look forward to following Analy along with you.         Sincerely,        EDYTA Miles        CC:   No Recipients    Problem list     Subjective  Analy Lin is a 74 y.o. female     Chief Complaint   Patient presents with   • Follow-up     6 months     Problem list:  1.  Atrial flutter  1.1 diagnosed by EKG with patient currently on flecainide for rhythm control and Xarelto for anticoagulation  1.2 radiofrequency ablation of atrial flutter May 2023 by Dr. Montana.  Atrial fibrillation was noted in recovery  1.3 patient continued on flecainide therapy and Xarelto for anticoagulation  2.  Palpitations  2.1 event monitor in 2021 demonstrating PVCs and nonsustained V. tach (4 beats)  3.  Preserved systolic function echocardiogram in 2021  4.  GERD    HPI    Patient is a 74-year-old female who presents to the office to be evaluated.  She has done well.  She describes an episode a few weeks ago when she felt a discomfort that seem to occur in her upper abdomen and radiate upward into her neck.  She thought it was GI related and was told by her primary that she would like for her to have an endoscopy.  She has not had that done.    She describes having this 3-4 times.  She has not had it since.  She feels well.  She complains of no discomfort.  No shortness of breath.  No PND or orthopnea.    She occasionally has breakthrough palpitations but is taking flecainide.  She apparently takes metoprolol on an as-needed basis.  She follows with EP services as well.  She does not describe any dizziness, presyncope, or syncope.  She does not describe any symptoms of bleeding on anticoagulation and is doing well.      Current Outpatient Medications on  File Prior to Visit   Medication Sig Dispense Refill   • albuterol sulfate  (90 Base) MCG/ACT inhaler Inhale 2 puffs As Needed.     • flecainide (TAMBOCOR) 100 MG tablet TAKE 1 TABLET BY MOUTH TWICE DAILY for heart 180 tablet 0   • metoprolol tartrate (LOPRESSOR) 25 MG tablet Take 1 tablet by mouth 3 (Three) Times a Day As Needed (AFib). (Patient taking differently: Take 1 tablet by mouth As Needed (AFib).) 180 tablet 3   • nitroglycerin (NITROSTAT) 0.3 MG SL tablet 1 under the tongue as needed for angina, may repeat q5mins for up three doses 25 tablet 0   • rivaroxaban (XARELTO) 20 MG tablet Take 1 tablet by mouth Daily With Dinner. 90 tablet 3   • [DISCONTINUED] acyclovir (ZOVIRAX) 800 MG tablet Take 1 tablet by mouth 5 (Five) Times a Day.       No current facility-administered medications on file prior to visit.       Patient has no known allergies.    Past Medical History:   Diagnosis Date   • Arrhythmia    • Arthritis    • Atrial fibrillation    • COVID-19 vaccine administered 1st - 03/05/2021    2nd -  03/26/2021 - [facility]  11/04/2021   • Mitral valve insufficiency 10/30/2023   • Shoulder injury     left   • Tear of meniscus of knee        Social History     Socioeconomic History   • Marital status:    Tobacco Use   • Smoking status: Never   • Smokeless tobacco: Never   Vaping Use   • Vaping status: Never Used   Substance and Sexual Activity   • Alcohol use: Yes     Alcohol/week: 1.0 standard drink of alcohol     Types: 1 Glasses of wine per week     Comment: socially (twice weekly)   • Drug use: No   • Sexual activity: Not Currently     Partners: Male       Family History   Problem Relation Age of Onset   • Cancer Other    • No Known Problems Mother    • Cancer Father    • Hypertension Father        Review of Systems   Constitutional:  Negative for activity change, appetite change, chills, fatigue and fever.   HENT: Negative.  Negative for congestion.    Eyes: Negative.  Negative for visual  "disturbance.   Respiratory: Negative.  Negative for apnea, cough, chest tightness, shortness of breath and wheezing.    Cardiovascular:  Positive for chest pain and palpitations. Negative for leg swelling.   Gastrointestinal: Negative.  Negative for blood in stool.   Endocrine: Negative.  Negative for cold intolerance and heat intolerance.   Genitourinary: Negative.  Negative for hematuria.   Musculoskeletal: Negative.  Negative for gait problem.   Skin: Negative.  Negative for color change, rash and wound.   Allergic/Immunologic: Positive for environmental allergies.   Neurological:  Positive for dizziness. Negative for syncope, weakness, light-headedness, numbness and headaches.   Hematological: Negative.  Does not bruise/bleed easily.   Psychiatric/Behavioral: Negative.  Negative for sleep disturbance.        Objective  Vitals:    04/30/24 0932   BP: 131/69   BP Location: Right arm   Patient Position: Sitting   Cuff Size: Adult   Pulse: 60   SpO2: 98%   Weight: 85.7 kg (189 lb)   Height: 160 cm (63\")      /69 (BP Location: Right arm, Patient Position: Sitting, Cuff Size: Adult)   Pulse 60   Ht 160 cm (63\")   Wt 85.7 kg (189 lb)   SpO2 98%   BMI 33.48 kg/m²     Lab Results (most recent)       None            Physical Exam  Vitals and nursing note reviewed.   Constitutional:       General: She is not in acute distress.     Appearance: Normal appearance. She is well-developed.   HENT:      Head: Normocephalic and atraumatic.   Eyes:      General: No scleral icterus.        Right eye: No discharge.         Left eye: No discharge.      Conjunctiva/sclera: Conjunctivae normal.   Neck:      Vascular: No carotid bruit.   Cardiovascular:      Rate and Rhythm: Normal rate and regular rhythm.      Heart sounds: Normal heart sounds. No murmur heard.     No friction rub. No gallop.   Pulmonary:      Effort: Pulmonary effort is normal. No respiratory distress.      Breath sounds: Normal breath sounds. No wheezing or " rales.   Chest:      Chest wall: No tenderness.   Musculoskeletal:      Right lower leg: No edema.      Left lower leg: No edema.   Skin:     General: Skin is warm and dry.      Coloration: Skin is not pale.      Findings: No erythema or rash.   Neurological:      Mental Status: She is alert and oriented to person, place, and time.      Cranial Nerves: No cranial nerve deficit.   Psychiatric:         Behavior: Behavior normal.         Procedure  Procedures       Assessment & Plan    Problems Addressed this Visit          Cardiac and Vasculature    Atrial flutter - Primary    Chest pain    Palpitations     Diagnoses         Codes Comments    Atrial flutter, unspecified type    -  Primary ICD-10-CM: I48.92  ICD-9-CM: 427.32     Palpitations     ICD-10-CM: R00.2  ICD-9-CM: 785.1     Chest pain, unspecified type     ICD-10-CM: R07.9  ICD-9-CM: 786.50           Recommendation  1.  Patient is a 74-year-old female status post ablation of atrial flutter doing well on anticoagulation.  She is on rhythm suppressive medicine.  We will continue.    2.  She may have occasional atrial fibrillation and breakthrough palpitations.  She is doing well we can monitor.  If symptoms were to worsen, she was instructed to call the office.    3.  Patient describes an atypical pain that seems to originate in the epigastric region and radiated up into her neck.  She feels it is GI related.  She has not had it in some time.  We discussed repeat testing but she would like to monitor.  If symptoms were to return or increase as discussed with her today, I want her to call the office.    4.  Otherwise, she is doing well.  We can see her back for follow-up in 6 months or sooner if needed.  Follow-up with primary and EP services as scheduled.           Patient did not bring med list or medicine bottles to appointment, med list has been reviewed and updated based on patient's knowledge of their meds.      Advance Care Planning  ACP discussion was  declined by the patient. Patient has an advance directive in EMR which is still valid.       Electronically signed by:

## 2024-07-18 DIAGNOSIS — R00.2 PALPITATIONS: ICD-10-CM

## 2024-07-18 RX ORDER — FLECAINIDE ACETATE 100 MG/1
TABLET ORAL
Qty: 180 TABLET | Refills: 0 | Status: SHIPPED | OUTPATIENT
Start: 2024-07-18

## 2024-10-18 DIAGNOSIS — R00.2 PALPITATIONS: ICD-10-CM

## 2024-10-18 RX ORDER — FLECAINIDE ACETATE 100 MG/1
TABLET ORAL
Qty: 180 TABLET | Refills: 0 | Status: SHIPPED | OUTPATIENT
Start: 2024-10-18

## 2025-01-13 DIAGNOSIS — R00.2 PALPITATIONS: ICD-10-CM

## 2025-01-13 RX ORDER — FLECAINIDE ACETATE 100 MG/1
TABLET ORAL
Qty: 180 TABLET | Refills: 0 | Status: SHIPPED | OUTPATIENT
Start: 2025-01-13

## 2025-02-26 ENCOUNTER — OFFICE VISIT (OUTPATIENT)
Dept: CARDIOLOGY | Facility: CLINIC | Age: 75
End: 2025-02-26
Payer: MEDICARE

## 2025-02-26 VITALS
WEIGHT: 195 LBS | DIASTOLIC BLOOD PRESSURE: 70 MMHG | SYSTOLIC BLOOD PRESSURE: 120 MMHG | BODY MASS INDEX: 34.55 KG/M2 | OXYGEN SATURATION: 96 % | HEART RATE: 89 BPM | HEIGHT: 63 IN

## 2025-02-26 DIAGNOSIS — I49.3 PVC'S (PREMATURE VENTRICULAR CONTRACTIONS): ICD-10-CM

## 2025-02-26 DIAGNOSIS — I34.0 MITRAL VALVE INSUFFICIENCY, UNSPECIFIED ETIOLOGY: ICD-10-CM

## 2025-02-26 DIAGNOSIS — I48.92 ATRIAL FLUTTER, UNSPECIFIED TYPE: Primary | ICD-10-CM

## 2025-02-26 PROCEDURE — 93000 ELECTROCARDIOGRAM COMPLETE: CPT | Performed by: PHYSICIAN ASSISTANT

## 2025-02-26 PROCEDURE — 99213 OFFICE O/P EST LOW 20 MIN: CPT | Performed by: PHYSICIAN ASSISTANT

## 2025-02-26 PROCEDURE — 3078F DIAST BP <80 MM HG: CPT | Performed by: PHYSICIAN ASSISTANT

## 2025-02-26 PROCEDURE — 3074F SYST BP LT 130 MM HG: CPT | Performed by: PHYSICIAN ASSISTANT

## 2025-02-26 NOTE — LETTER
February 27, 2025     MARTA Gillis  [address]  [place] [address]    Patient: Analy Lin   YOB: 1950   Date of Visit: 2/26/2025       Dear MARTA Gillis    Analy Lin was in my office today. Below is a copy of my note.    If you have questions, please do not hesitate to call me. I look forward to following Analy along with you.         Sincerely,        EDYTA Miles        CC: No Recipients    Problem list     Subjective  Analy Lin is a 75 y.o. female     Chief Complaint   Patient presents with   • 6 month follow up     Palpitations     Problem list:  1.  Atrial flutter  1.1 diagnosed by EKG with patient currently on flecainide for rhythm control and Xarelto for anticoagulation  1.2 radiofrequency ablation of atrial flutter May 2023 by Dr. Montana.  Atrial fibrillation was noted in recovery  1.3 patient continued on flecainide therapy and Xarelto for anticoagulation  2.  Palpitations  2.1 event monitor in 2021 demonstrating PVCs and nonsustained V. tach (4 beats)  3.  Preserved systolic function echocardiogram in 2021  4.  GERD    HPI    Patient is a 75-year-old female presenting back to the office for routine cardiac assessment.  Overall, she has done well.  She does not complain of any chest pain or pressure at all.  No dyspnea.  No PND or orthopnea.    She may occasionally sense a flutter type sensation.  She has been on flecainide for rhythm suppression.  She does not describe any symptoms of bleeding on Xarelto no symptoms of cerebral Bolick events.  She is stable otherwise.      Current Outpatient Medications on File Prior to Visit   Medication Sig Dispense Refill   • flecainide (TAMBOCOR) 100 MG tablet TAKE 1 TABLET BY MOUTH TWO TIMES A DAY for heart AS DIRECTED BY YOUR PROVIDER 180 tablet 0   • metoprolol tartrate (LOPRESSOR) 25 MG tablet Take 1 tablet by mouth 3 (Three) Times a Day As Needed (AFib). (Patient taking differently: Take 1 tablet by mouth As  Needed (AFib).) 180 tablet 3   • nitroglycerin (NITROSTAT) 0.3 MG SL tablet 1 under the tongue as needed for angina, may repeat q5mins for up three doses 25 tablet 0   • rivaroxaban (XARELTO) 20 MG tablet Take 1 tablet by mouth Daily With Dinner. 90 tablet 3     No current facility-administered medications on file prior to visit.       Patient has no known allergies.    Past Medical History:   Diagnosis Date   • Arrhythmia    • Arthritis    • Atrial fibrillation    • COVID-19 vaccine administered 1st - 03/05/2021    2nd -  03/26/2021 - [facility]  11/04/2021   • Mitral valve insufficiency 10/30/2023   • Shoulder injury     left   • Tear of meniscus of knee        Social History     Socioeconomic History   • Marital status:    Tobacco Use   • Smoking status: Never   • Smokeless tobacco: Never   Vaping Use   • Vaping status: Never Used   Substance and Sexual Activity   • Alcohol use: Yes     Alcohol/week: 1.0 standard drink of alcohol     Types: 1 Glasses of wine per week     Comment: socially (twice weekly)   • Drug use: No   • Sexual activity: Not Currently     Partners: Male       Family History   Problem Relation Age of Onset   • Cancer Other    • No Known Problems Mother    • Cancer Father    • Hypertension Father        Review of Systems   Constitutional:  Negative for activity change, appetite change, chills, fatigue and fever.   HENT: Negative.  Negative for congestion, sinus pressure and sinus pain.    Eyes: Negative.  Negative for visual disturbance.   Respiratory: Negative.  Negative for apnea, cough, chest tightness, shortness of breath and wheezing.    Cardiovascular:  Positive for palpitations. Negative for chest pain and leg swelling.   Gastrointestinal: Negative.  Negative for blood in stool.   Endocrine: Negative.  Negative for cold intolerance and heat intolerance.   Genitourinary: Negative.  Negative for hematuria.   Musculoskeletal: Negative.  Negative for gait problem.   Skin: Negative.   "Negative for color change, rash and wound.   Allergic/Immunologic: Negative.  Negative for environmental allergies and food allergies.   Neurological:  Negative for dizziness, syncope, weakness, light-headedness, numbness and headaches.   Hematological: Negative.  Does not bruise/bleed easily.   Psychiatric/Behavioral: Negative.  Negative for sleep disturbance.        Objective  Vitals:    02/26/25 1523   BP: 120/70   BP Location: Right arm   Patient Position: Sitting   Cuff Size: Adult   Pulse: 89   SpO2: 96%   Weight: 88.5 kg (195 lb)   Height: 160 cm (63\")      /70 (BP Location: Right arm, Patient Position: Sitting, Cuff Size: Adult)   Pulse 89   Ht 160 cm (63\")   Wt 88.5 kg (195 lb)   SpO2 96%   BMI 34.54 kg/m²     Lab Results (most recent)       None            Physical Exam    Procedure    ECG 12 Lead    Date/Time: 2/26/2025 3:23 PM  Performed by: Marco A Christian PA    Authorized by: Marco A Christian PA  Comparison: compared with previous ECG from 2/23/2024  Comparison to previous ECG: EKG demonstrates sinus rhythm at 67 bpm with low voltage, incomplete right bundle branch block and no acute ST changes             Assessment & Plan    Problems Addressed this Visit          Cardiac and Vasculature    PVC's (premature ventricular contractions)    Atrial flutter - Primary    Mitral valve insufficiency     Diagnoses         Codes Comments    Atrial flutter, unspecified type    -  Primary ICD-10-CM: I48.92  ICD-9-CM: 427.32     Mitral valve insufficiency, unspecified etiology     ICD-10-CM: I34.0  ICD-9-CM: 424.0     PVC's (premature ventricular contractions)     ICD-10-CM: I49.3  ICD-9-CM: 427.69             Recommendations  1.  Patient is a 75-year-old female presenting to the office for evaluation with history of atrial flutter.  She has done well with occasional breakthrough symptoms.  For now, she has done well on medical therapy and we will make no changes.  She has no complaints of bleeding " "on Xarelto.  We can monitor for now.    2.  Trivial to mild mitral valve insufficiency by echocardiogram in the past.  She has no significant murmur on examination.  We can monitor for now.    3.  History of PVCs which may be the source of her \"fluttering\".  For now, we will continue beta-blocker therapy.    4.  We can see patient back for follow-up in 6 months or sooner if needed.  Follow-up with primary as scheduled.         Patient did not bring med list or medicine bottles to appointment, med list has been reviewed and updated based on patient's knowledge of their meds.      Advance Care Planning  ACP discussion was declined by the patient. Patient has an advance directive in EMR which is still valid.       Electronically signed by:    "

## 2025-02-26 NOTE — PROGRESS NOTES
Problem list     Subjective   Analy Lin is a 75 y.o. female     Chief Complaint   Patient presents with    6 month follow up     Palpitations     Problem list:  1.  Atrial flutter  1.1 diagnosed by EKG with patient currently on flecainide for rhythm control and Xarelto for anticoagulation  1.2 radiofrequency ablation of atrial flutter May 2023 by Dr. Montana.  Atrial fibrillation was noted in recovery  1.3 patient continued on flecainide therapy and Xarelto for anticoagulation  2.  Palpitations  2.1 event monitor in 2021 demonstrating PVCs and nonsustained V. tach (4 beats)  3.  Preserved systolic function echocardiogram in 2021  4.  GERD    HPI    Patient is a 75-year-old female presenting back to the office for routine cardiac assessment.  Overall, she has done well.  She does not complain of any chest pain or pressure at all.  No dyspnea.  No PND or orthopnea.    She may occasionally sense a flutter type sensation.  She has been on flecainide for rhythm suppression.  She does not describe any symptoms of bleeding on Xarelto no symptoms of cerebral Bolick events.  She is stable otherwise.      Current Outpatient Medications on File Prior to Visit   Medication Sig Dispense Refill    flecainide (TAMBOCOR) 100 MG tablet TAKE 1 TABLET BY MOUTH TWO TIMES A DAY for heart AS DIRECTED BY YOUR PROVIDER 180 tablet 0    metoprolol tartrate (LOPRESSOR) 25 MG tablet Take 1 tablet by mouth 3 (Three) Times a Day As Needed (AFib). (Patient taking differently: Take 1 tablet by mouth As Needed (AFib).) 180 tablet 3    nitroglycerin (NITROSTAT) 0.3 MG SL tablet 1 under the tongue as needed for angina, may repeat q5mins for up three doses 25 tablet 0    rivaroxaban (XARELTO) 20 MG tablet Take 1 tablet by mouth Daily With Dinner. 90 tablet 3     No current facility-administered medications on file prior to visit.       Patient has no known allergies.    Past Medical History:   Diagnosis Date    Arrhythmia     Arthritis     Atrial  fibrillation     COVID-19 vaccine administered 1st - 03/05/2021    2nd -  03/26/2021 - [facility]  11/04/2021    Mitral valve insufficiency 10/30/2023    Shoulder injury     left    Tear of meniscus of knee        Social History     Socioeconomic History    Marital status:    Tobacco Use    Smoking status: Never    Smokeless tobacco: Never   Vaping Use    Vaping status: Never Used   Substance and Sexual Activity    Alcohol use: Yes     Alcohol/week: 1.0 standard drink of alcohol     Types: 1 Glasses of wine per week     Comment: socially (twice weekly)    Drug use: No    Sexual activity: Not Currently     Partners: Male       Family History   Problem Relation Age of Onset    Cancer Other     No Known Problems Mother     Cancer Father     Hypertension Father        Review of Systems   Constitutional:  Negative for activity change, appetite change, chills, fatigue and fever.   HENT: Negative.  Negative for congestion, sinus pressure and sinus pain.    Eyes: Negative.  Negative for visual disturbance.   Respiratory: Negative.  Negative for apnea, cough, chest tightness, shortness of breath and wheezing.    Cardiovascular:  Positive for palpitations. Negative for chest pain and leg swelling.   Gastrointestinal: Negative.  Negative for blood in stool.   Endocrine: Negative.  Negative for cold intolerance and heat intolerance.   Genitourinary: Negative.  Negative for hematuria.   Musculoskeletal: Negative.  Negative for gait problem.   Skin: Negative.  Negative for color change, rash and wound.   Allergic/Immunologic: Negative.  Negative for environmental allergies and food allergies.   Neurological:  Negative for dizziness, syncope, weakness, light-headedness, numbness and headaches.   Hematological: Negative.  Does not bruise/bleed easily.   Psychiatric/Behavioral: Negative.  Negative for sleep disturbance.        Objective   Vitals:    02/26/25 1523   BP: 120/70   BP Location: Right arm   Patient Position: Sitting  "  Cuff Size: Adult   Pulse: 89   SpO2: 96%   Weight: 88.5 kg (195 lb)   Height: 160 cm (63\")      /70 (BP Location: Right arm, Patient Position: Sitting, Cuff Size: Adult)   Pulse 89   Ht 160 cm (63\")   Wt 88.5 kg (195 lb)   SpO2 96%   BMI 34.54 kg/m²     Lab Results (most recent)       None            Physical Exam    Procedure     ECG 12 Lead    Date/Time: 2/26/2025 3:23 PM  Performed by: Marco A Christian PA    Authorized by: Marco A Christian PA  Comparison: compared with previous ECG from 2/23/2024  Comparison to previous ECG: EKG demonstrates sinus rhythm at 67 bpm with low voltage, incomplete right bundle branch block and no acute ST changes             Assessment & Plan     Problems Addressed this Visit          Cardiac and Vasculature    PVC's (premature ventricular contractions)    Atrial flutter - Primary    Mitral valve insufficiency     Diagnoses         Codes Comments    Atrial flutter, unspecified type    -  Primary ICD-10-CM: I48.92  ICD-9-CM: 427.32     Mitral valve insufficiency, unspecified etiology     ICD-10-CM: I34.0  ICD-9-CM: 424.0     PVC's (premature ventricular contractions)     ICD-10-CM: I49.3  ICD-9-CM: 427.69             Recommendations  1.  Patient is a 75-year-old female presenting to the office for evaluation with history of atrial flutter.  She has done well with occasional breakthrough symptoms.  For now, she has done well on medical therapy and we will make no changes.  She has no complaints of bleeding on Xarelto.  We can monitor for now.    2.  Trivial to mild mitral valve insufficiency by echocardiogram in the past.  She has no significant murmur on examination.  We can monitor for now.    3.  History of PVCs which may be the source of her \"fluttering\".  For now, we will continue beta-blocker therapy.    4.  We can see patient back for follow-up in 6 months or sooner if needed.  Follow-up with primary as scheduled.         Patient did not bring med list or " medicine bottles to appointment, med list has been reviewed and updated based on patient's knowledge of their meds.      Advance Care Planning   ACP discussion was declined by the patient. Patient has an advance directive in EMR which is still valid.       Electronically signed by:

## 2025-02-27 PROBLEM — I34.0 MITRAL VALVE INSUFFICIENCY: Status: ACTIVE | Noted: 2025-02-27

## 2025-03-07 ENCOUNTER — OFFICE VISIT (OUTPATIENT)
Dept: CARDIOLOGY | Facility: CLINIC | Age: 75
End: 2025-03-07
Payer: MEDICARE

## 2025-03-07 VITALS
HEART RATE: 70 BPM | BODY MASS INDEX: 33.66 KG/M2 | OXYGEN SATURATION: 97 % | SYSTOLIC BLOOD PRESSURE: 130 MMHG | HEIGHT: 63 IN | WEIGHT: 190 LBS | DIASTOLIC BLOOD PRESSURE: 76 MMHG

## 2025-03-07 DIAGNOSIS — R00.2 PALPITATIONS: ICD-10-CM

## 2025-03-07 DIAGNOSIS — I10 ESSENTIAL HYPERTENSION: ICD-10-CM

## 2025-03-07 DIAGNOSIS — I48.0 AF (PAROXYSMAL ATRIAL FIBRILLATION): Primary | ICD-10-CM

## 2025-03-07 RX ORDER — FLECAINIDE ACETATE 100 MG/1
100 TABLET ORAL 2 TIMES DAILY
Qty: 180 TABLET | Refills: 0 | Status: SHIPPED | OUTPATIENT
Start: 2025-03-07

## 2025-03-07 NOTE — PROGRESS NOTES
Cardiac Electrophysiology Outpatient Follow Up Note            [facility]    Follow Up Office Visit      Analy Lin  [id number]  03/07/2025  [unfilled]  [unfilled]    Primary Care Physician: Swathi Siegel APRN    Referred By: No ref. provider found    Subjective     CC: atrial fibrillation/flutter     History of Present Illness:   Analy Lin is a 75 y.o. female who presents to my electrophysiology clinic for follow up of atrial flutter.      Past Medical History:   Diagnosis Date    Arrhythmia     Arthritis     Atrial fibrillation     COVID-19 vaccine administered 1st - 03/05/2021    2nd -  03/26/2021 - Pfzier  11/04/2021    Mitral valve insufficiency 10/30/2023    Shoulder injury     left    Tear of meniscus of knee        Past Surgical History:   Procedure Laterality Date    ABLATION OF DYSRHYTHMIC FOCUS  05/2023    APPENDECTOMY      CARDIAC ELECTROPHYSIOLOGY PROCEDURE N/A 05/30/2023    Procedure: Ablation atrial flutter. Stop Flecainide 3 days prior. DNS Jey.;  Surgeon: Manpreet Montana DO;  Location: [facility] INVASIVE LOCATION;  Service: Cardiovascular;  Laterality: N/A;    KNEE SURGERY Right     REPLACEMENT TOTAL KNEE Right 2023    SHOULDER SURGERY Left     TONSILLECTOMY         Family History   Problem Relation Age of Onset    Cancer Other     No Known Problems Mother     Cancer Father     Hypertension Father     Arrhythmia Sister        Social History     Socioeconomic History    Marital status:    Tobacco Use    Smoking status: Never    Smokeless tobacco: Never   Vaping Use    Vaping status: Never Used   Substance and Sexual Activity    Alcohol use: Yes     Alcohol/week: 1.0 standard drink of alcohol     Types: 1 Glasses of wine per week     Comment: socially (twice weekly)    Drug use: No    Sexual activity: Not Currently     Partners: Male     Birth control/protection: Post-menopausal         Current Outpatient Medications:  "    flecainide (TAMBOCOR) 100 MG tablet, TAKE 1 TABLET BY MOUTH TWO TIMES A DAY for heart AS DIRECTED BY YOUR PROVIDER, Disp: 180 tablet, Rfl: 0    metoprolol tartrate (LOPRESSOR) 25 MG tablet, Take 1 tablet by mouth 3 (Three) Times a Day As Needed (AFib). (Patient taking differently: Take 1 tablet by mouth As Needed (AFib).), Disp: 180 tablet, Rfl: 3    nitroglycerin (NITROSTAT) 0.3 MG SL tablet, 1 under the tongue as needed for angina, may repeat q5mins for up three doses, Disp: 25 tablet, Rfl: 0    No Known Allergies    Objective     Vitals:    03/07/25 1249   BP: 130/76   BP Location: Left arm   Patient Position: Sitting   Pulse: 70   SpO2: 97%   Weight: 86.2 kg (190 lb)   Height: 160 cm (63\")     Body mass index is 33.66 kg/m².    Constitutional:       Appearance: Normal and healthy appearance. Not in distress.   Eyes:      Pupils: Pupils are equal, round, and reactive to light.   HENT:      Head: Normocephalic and atraumatic.   Pulmonary:      Effort: Pulmonary effort is normal.      Breath sounds: Normal breath sounds.   Cardiovascular:      PMI at left midclavicular line. Normal rate. Regular rhythm. Normal S1. Normal S2.       Murmurs: There is no murmur.      No gallop.  No click. No rub.   Pulses:     Intact distal pulses.   Edema:     Peripheral edema absent.   Abdominal:      General: Abdomen is flat. Bowel sounds are normal. There is no distension.      Palpations: Abdomen is soft.   Skin:     General: Skin is warm and dry.   Neurological:      General: No focal deficit present.      Mental Status: Alert, oriented to person, place, and time and oriented to person, place and time.   Psychiatric:         Attention and Perception: Attention normal.         Mood and Affect: Mood normal.         Behavior: Behavior is cooperative.         Lab Results   Component Value Date    GLUCOSE 101 (H) 05/30/2023    CALCIUM 9.8 05/30/2023     05/30/2023    K 3.9 05/30/2023    CO2 28.0 05/30/2023     (H) " "05/30/2023    BUN 13 05/30/2023    CREATININE 0.77 05/30/2023    EGFRIFNONA 72 08/16/2021    BCR 16.9 05/30/2023    ANIONGAP 9.0 05/30/2023     Lab Results   Component Value Date    WBC 3.40 05/30/2023    HGB 12.7 05/30/2023    HCT 38.0 05/30/2023    MCV 95.2 05/30/2023     05/30/2023     No results found for: \"INR\", \"PROTIME\"  Lab Results   Component Value Date    TSH 2.020 06/09/2022       Cardiac Testing:     I personally viewed and interpreted the patient's EKG/Telemetry/lab data.    Procedures    Tobacco Cessation: N/A  Obstructive Sleep Apnea Screening: N/A    Advance Care Planning   ACP discussion was declined by the patient. Patient does not have an advance directive, declines further assistance.       Assessment & Plan      Assessment & Plan  AF (paroxysmal atrial fibrillation)  She has not had any significant issues with atrial arrhythmias since she last saw us in clinic.  She is on Flecainide for arrhythmia suppression.  She would like to follow with her primary cardiology provider.         Essential hypertension    /76 She states her blood pressure usually runs 110's at home             Follow Up:   No follow-ups on file.    Thank you for allowing me to participate in the care of your patient. Please to not hesitate to contact me with additional questions or concerns.      MARTA Sainz  [facility] / [facility]  "

## 2025-03-17 RX ORDER — RIVAROXABAN 20 MG/1
TABLET, FILM COATED ORAL
Qty: 90 TABLET | Refills: 0 | OUTPATIENT
Start: 2025-03-17

## 2025-03-21 ENCOUNTER — TELEPHONE (OUTPATIENT)
Dept: CARDIOLOGY | Facility: CLINIC | Age: 75
End: 2025-03-21
Payer: MEDICARE

## 2025-03-21 NOTE — TELEPHONE ENCOUNTER
".  Patient is a 75-year-old female presenting to the office for evaluation with history of atrial flutter.  She has done well with occasional breakthrough symptoms.  For now, she has done well on medical therapy and we will make no changes.  She has no complaints of bleeding on Xarelto.  We can monitor for now.     2.  Trivial to mild mitral valve insufficiency by echocardiogram in the past.  She has no significant murmur on examination.  We can monitor for now.     3.  History of PVCs which may be the source of her \"fluttering\".  For now, we will continue beta-blocker therapy.     4.  We can see patient back for follow-up in 6 months or sooner if needed.  Follow-up with primary as scheduled.       Patient came in stating needs her xarelto 20 mg sent to pharmacy  "

## 2025-04-15 DIAGNOSIS — R00.2 PALPITATIONS: ICD-10-CM

## 2025-04-15 RX ORDER — FLECAINIDE ACETATE 100 MG/1
TABLET ORAL
Qty: 180 TABLET | Refills: 3 | Status: SHIPPED | OUTPATIENT
Start: 2025-04-15

## 2025-04-25 DIAGNOSIS — R00.2 PALPITATIONS: ICD-10-CM

## 2025-04-25 RX ORDER — FLECAINIDE ACETATE 100 MG/1
TABLET ORAL
Qty: 180 TABLET | Refills: 0 | Status: SHIPPED | OUTPATIENT
Start: 2025-04-25

## (undated) DEVICE — LOCATION REFERENCE PATCH KIT: Brand: RHYTHMIA™ MAPPING SYSTEM

## (undated) DEVICE — ABLATION CATHETER: Brand: INTELLANAV MIFI™ OPEN-IRRIGATED

## (undated) DEVICE — PK CATH CARD 10

## (undated) DEVICE — INTRO SHEATH TRNSEP .032 SL0 8.5F 63CM

## (undated) DEVICE — INTRO SHEATH ENGAGE W/50 GW .038 8F12

## (undated) DEVICE — ST INF PRI SMRTSTE 20DRP 2VLV 24ML 117

## (undated) DEVICE — ST EXT IV SMRTSTE 2VLV FIX M LL 6ML 41

## (undated) DEVICE — SET PRIMARY GRVTY 10DP MALE LL 104IN

## (undated) DEVICE — INTRO SHEATH ENGAGE W/50 GW .038 9F12

## (undated) DEVICE — CANN NASL CO2 DIVIDED A/

## (undated) DEVICE — DRSNG SURESITE123 4X4.8IN

## (undated) DEVICE — CATH ULTRASND ECHO ACUNAV FOR ACUSON 8F 90CM

## (undated) DEVICE — SOL NACL 0.9PCT 1000ML

## (undated) DEVICE — INTRO STEER AGILIS NXT MED/CURL 8.5F

## (undated) DEVICE — SYS CLS VASC/VENI VASCADE MVP 6TO12F

## (undated) DEVICE — LIMB HOLDER, WRIST/ANKLE: Brand: DEROYAL

## (undated) DEVICE — Device: Brand: WEBSTER CS

## (undated) DEVICE — OPEN-IRRIGATION TUBING SET: Brand: METRIQ™ IRRIGATION TUBING SET

## (undated) DEVICE — DECANT BG O JET

## (undated) DEVICE — ADULT, W/LG. BACK PAD, RADIOTRANSPARENT ELEMENT AND LEAD WIRE COMPATIBLE W/: Brand: DEFIBRILLATION ELECTRODES

## (undated) DEVICE — ELECTRD RETRN/GRND MEGADYNE SGL/PLT W/CORD 9FT DISP